# Patient Record
Sex: FEMALE | Race: WHITE | NOT HISPANIC OR LATINO | Employment: PART TIME | URBAN - METROPOLITAN AREA
[De-identification: names, ages, dates, MRNs, and addresses within clinical notes are randomized per-mention and may not be internally consistent; named-entity substitution may affect disease eponyms.]

---

## 2017-08-04 ENCOUNTER — OFFICE VISIT (OUTPATIENT)
Dept: BEHAVIORAL HEALTH | Facility: PHYSICIAN GROUP | Age: 22
End: 2017-08-04
Payer: COMMERCIAL

## 2017-08-04 DIAGNOSIS — F31.9 BIPOLAR 1 DISORDER (HCC): Primary | ICD-10-CM

## 2017-08-04 PROCEDURE — 99214 OFFICE O/P EST MOD 30 MIN: CPT | Performed by: STUDENT IN AN ORGANIZED HEALTH CARE EDUCATION/TRAINING PROGRAM

## 2017-08-04 RX ORDER — LAMOTRIGINE 100 MG/1
100 TABLET ORAL 2 TIMES DAILY
Qty: 60 TAB | Refills: 0 | Status: SHIPPED | OUTPATIENT
Start: 2017-08-04 | End: 2017-09-05 | Stop reason: SDUPTHER

## 2017-08-04 RX ORDER — LAMOTRIGINE 100 MG/1
100 TABLET ORAL 2 TIMES DAILY
Qty: 60 TAB | Refills: 0 | Status: SHIPPED | OUTPATIENT
Start: 2017-08-04 | End: 2017-08-04 | Stop reason: SDUPTHER
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
## 2017-08-04 NOTE — MR AVS SNAPSHOT
Michelle Ziegler   2017 9:00 AM   Appointment   MRN: [id number]    Department:  [facility]    Dept Phone:  [phone]    Description:  Female : 1995   Provider:  Magdy Bishop M.D.           Allergies as of 2017     Allergen Noted Reactions    Ceclor [Cefaclor] 2010   Vomiting    Rocephin [Ceftriaxone Sodium] 2010   Rash    As infant      Basic Information     Date Of Birth Sex Race Ethnicity Preferred Language    1995 Female White Non- English      Your appointments     Sep 05, 2017  9:30 AM   Follow Up Med Management with Magdy Bishop M.D.   [facility] [address]    [address] [place] [address] [address]  [facility] [id number] [phone]            Oct 16, 2017 10:30 AM   Initial Behavioral Health Eval with Mercedes Beckwith L.C.S.W.   [facility] [address]    [address] [place] [id number] [phone]              Health Maintenance     Patient has no pending health maintenance at this time      Current Immunizations     No immunizations on file.      Below and/or attached are the medications your provider expects you to take. Review all of your home medications and newly ordered medications with your provider and/or pharmacist. Follow medication instructions as directed by your provider and/or pharmacist. Please keep your medication list with you and share with your provider. Update the information when medications are discontinued, doses are changed, or new medications (including over-the-counter products) are added; and carry medication information at all times in the event of emergency situations     Allergies:  CECLOR - Vomiting     ROCEPHIN - Rash               Medications  Valid as of: 2017 -  9:46 AM    Generic Name Brand Name Tablet Size Instructions for use    Drospirenone-Ethinyl Estradiol (Tab) CHRISTINA 3-0.02 MG Take  by mouth.        FLUoxetine HCl   Take  by mouth.        Meclizine HCl (Tab)  ANTIVERT 25 MG Take 1 Tab by mouth 3 times a day as needed.        .                 Medicines prescribed today were sent to:     None      Medication refill instructions:       If your prescription bottle indicates you have medication refills left, it is not necessary to call your provider’s office. Please contact your pharmacy and they will refill your medication.    If your prescription bottle indicates you do not have any refills left, you may request refills at any time through one of the following ways: The online Vis[employer] World system (except Urgent Care), by calling your provider’s office, or by asking your pharmacy to contact your provider’s office with a refill request. Medication refills are processed only during regular business hours and may not be available until the next business day. Your provider may request additional information or to have a follow-up visit with you prior to refilling your medication.   *Please Note: Medication refills are assigned a new Rx number when refilled electronically. Your pharmacy may indicate that no refills were authorized even though a new prescription for the same medication is available at the pharmacy. Please request the medicine by name with the pharmacy before contacting your provider for a refill.           Visible World Access Code: [email or web address]  Expires: 9/3/2017  9:46 AM    Visible World  A secure, online tool to manage your health information     [employer] Visible World® is a secure, online tool that connects you to your personalized health information from the privacy of your home -- day or night - making it very easy for you to manage your healthcare. Once the activation process is completed, you can even access your medical information using the Visible World yon, which is available for free in the Apple Yon store or Google Play store.     Visible World provides the following levels of access (as shown below):   My Chart Features   [facility] Primary Care Doctor [facility]  Specialists  [facility]  Urgent  Care [facility]  Primary Care  Doctor   Email your healthcare team securely and privately 24/7 X X X    Manage appointments: schedule your next appointment; view details of past/upcoming appointments X      Request prescription refills. X      View recent personal medical records, including lab and immunizations X X X X   View health record, including health history, allergies, medications X X X X   Read reports about your outpatient visits, procedures, consult and ER notes X X X X   See your discharge summary, which is a recap of your hospital and/or ER visit that includes your diagnosis, lab results, and care plan. X X       How to register for [employer] Information:  1. Go to  [email or web address].  2. Click on the Sign Up Now box, which takes you to the New Member Sign Up page. You will need to provide the following information:  a. Enter your [employer] Information Access Code exactly as it appears at the top of this page. (You will not need to use this code after you’ve completed the sign-up process. If you do not sign up before the expiration date, you must request a new code.)   b. Enter your date of birth.   c. Enter your home email address.   d. Click Submit, and follow the next screen’s instructions.  3. Create a [employer] Information ID. This will be your [employer] Information login ID and cannot be changed, so think of one that is secure and easy to remember.  4. Create a [employer] Information password. You can change your password at any time.  5. Enter your Password Reset Question and Answer. This can be used at a later time if you forget your password.   6. Enter your e-mail address. This allows you to receive e-mail notifications when new information is available in [employer] Information.  7. Click Sign Up. You can now view your health information.    For assistance activating your [employer] Information account, call [phone]

## 2017-08-23 ENCOUNTER — HOSPITAL ENCOUNTER (OUTPATIENT)
Dept: LAB | Facility: MEDICAL CENTER | Age: 22
End: 2017-08-23
Attending: STUDENT IN AN ORGANIZED HEALTH CARE EDUCATION/TRAINING PROGRAM
Payer: COMMERCIAL

## 2017-08-23 LAB
25(OH)D3 SERPL-MCNC: 16 NG/ML (ref 30–100)
ANION GAP SERPL CALC-SCNC: 8 MMOL/L (ref 0–11.9)
BASOPHILS # BLD AUTO: 0.5 % (ref 0–1.8)
BASOPHILS # BLD: 0.06 K/UL (ref 0–0.12)
CHLORIDE SERPL-SCNC: 107 MMOL/L (ref 96–112)
CHOLEST SERPL-MCNC: 167 MG/DL (ref 100–199)
CO2 SERPL-SCNC: 24 MMOL/L (ref 20–33)
EOSINOPHIL # BLD AUTO: 0.19 K/UL (ref 0–0.51)
EOSINOPHIL NFR BLD: 1.7 % (ref 0–6.9)
ERYTHROCYTE [DISTWIDTH] IN BLOOD BY AUTOMATED COUNT: 41 FL (ref 35.9–50)
FASTING STATUS PATIENT QL REPORTED: NORMAL
HCT VFR BLD AUTO: 42.7 % (ref 37–47)
HDLC SERPL-MCNC: 50 MG/DL
HGB BLD-MCNC: 13.8 G/DL (ref 12–16)
IMM GRANULOCYTES # BLD AUTO: 0.09 K/UL (ref 0–0.11)
IMM GRANULOCYTES NFR BLD AUTO: 0.8 % (ref 0–0.9)
LDLC SERPL CALC-MCNC: 98 MG/DL
LYMPHOCYTES # BLD AUTO: 3.47 K/UL (ref 1–4.8)
LYMPHOCYTES NFR BLD: 31.8 % (ref 22–41)
MCH RBC QN AUTO: 28.8 PG (ref 27–33)
MCHC RBC AUTO-ENTMCNC: 32.3 G/DL (ref 33.6–35)
MCV RBC AUTO: 89.1 FL (ref 81.4–97.8)
MONOCYTES # BLD AUTO: 0.82 K/UL (ref 0–0.85)
MONOCYTES NFR BLD AUTO: 7.5 % (ref 0–13.4)
NEUTROPHILS # BLD AUTO: 6.29 K/UL (ref 2–7.15)
NEUTROPHILS NFR BLD: 57.7 % (ref 44–72)
NRBC # BLD AUTO: 0 K/UL
NRBC BLD AUTO-RTO: 0 /100 WBC
PLATELET # BLD AUTO: 378 K/UL (ref 164–446)
PMV BLD AUTO: 10.9 FL (ref 9–12.9)
POTASSIUM SERPL-SCNC: 4.4 MMOL/L (ref 3.6–5.5)
RBC # BLD AUTO: 4.79 M/UL (ref 4.2–5.4)
SODIUM SERPL-SCNC: 139 MMOL/L (ref 135–145)
T4 FREE SERPL-MCNC: 0.78 NG/DL (ref 0.53–1.43)
TRIGL SERPL-MCNC: 93 MG/DL (ref 0–149)
TSH SERPL DL<=0.005 MIU/L-ACNC: 2.89 UIU/ML (ref 0.3–3.7)
VIT B12 SERPL-MCNC: 450 PG/ML (ref 211–911)
WBC # BLD AUTO: 10.9 K/UL (ref 4.8–10.8)

## 2017-08-23 PROCEDURE — 80061 LIPID PANEL: CPT

## 2017-08-23 PROCEDURE — 82607 VITAMIN B-12: CPT

## 2017-08-23 PROCEDURE — 80051 ELECTROLYTE PANEL: CPT

## 2017-08-23 PROCEDURE — 84439 ASSAY OF FREE THYROXINE: CPT

## 2017-08-23 PROCEDURE — 85025 COMPLETE CBC W/AUTO DIFF WBC: CPT

## 2017-08-23 PROCEDURE — 82306 VITAMIN D 25 HYDROXY: CPT

## 2017-08-23 PROCEDURE — 84443 ASSAY THYROID STIM HORMONE: CPT

## 2017-08-23 PROCEDURE — 36415 COLL VENOUS BLD VENIPUNCTURE: CPT

## 2017-08-28 ENCOUNTER — OFFICE VISIT (OUTPATIENT)
Dept: URGENT CARE | Facility: PHYSICIAN GROUP | Age: 22
End: 2017-08-28
Payer: COMMERCIAL

## 2017-08-28 VITALS
SYSTOLIC BLOOD PRESSURE: 120 MMHG | TEMPERATURE: 98.7 F | BODY MASS INDEX: 41.99 KG/M2 | WEIGHT: 237 LBS | HEART RATE: 107 BPM | RESPIRATION RATE: 18 BRPM | OXYGEN SATURATION: 97 % | DIASTOLIC BLOOD PRESSURE: 76 MMHG | HEIGHT: 63 IN

## 2017-08-28 DIAGNOSIS — J02.9 PHARYNGITIS, UNSPECIFIED ETIOLOGY: ICD-10-CM

## 2017-08-28 DIAGNOSIS — E66.01 MORBID OBESITY WITH BMI OF 40.0-44.9, ADULT (HCC): ICD-10-CM

## 2017-08-28 LAB
INT CON NEG: NORMAL
INT CON POS: NORMAL
S PYO AG THROAT QL: NORMAL

## 2017-08-28 PROCEDURE — 99213 OFFICE O/P EST LOW 20 MIN: CPT | Performed by: PHYSICIAN ASSISTANT

## 2017-08-28 PROCEDURE — 87880 STREP A ASSAY W/OPTIC: CPT | Performed by: PHYSICIAN ASSISTANT

## 2017-08-28 ASSESSMENT — ENCOUNTER SYMPTOMS
COUGH: 0
CHILLS: 0
SWOLLEN GLANDS: 0
SORE THROAT: 1
TROUBLE SWALLOWING: 0
SHORTNESS OF BREATH: 0
WHEEZING: 0
STRIDOR: 0
FEVER: 1
HEADACHES: 0
NECK PAIN: 0

## 2017-08-28 NOTE — PATIENT INSTRUCTIONS

## 2017-08-28 NOTE — PROGRESS NOTES
Subjective:      Michelle Ziegler is a 22 y.o. female who presents with Pharyngitis (x1day fever)            Sore throat for the last 2 days with fever last night. Fever resolved today. No other complaints. Concerned about possible strep      Pharyngitis    This is a new problem. The current episode started yesterday. The problem has been waxing and waning. Neither side of throat is experiencing more pain than the other. The maximum temperature recorded prior to her arrival was 102 - 102.9 F. The fever has been present for less than 1 day. The pain is moderate. Pertinent negatives include no congestion, coughing, ear pain, headaches, neck pain, shortness of breath, stridor, swollen glands or trouble swallowing. She has had exposure to strep. She has had no exposure to mono. She has tried nothing for the symptoms. The treatment provided no relief.       Review of Systems   Constitutional: Positive for fever. Negative for chills.   HENT: Positive for sore throat. Negative for congestion, ear pain and trouble swallowing.    Respiratory: Negative for cough, shortness of breath, wheezing and stridor.    Musculoskeletal: Negative for neck pain.   Neurological: Negative for headaches.     Allergies:Ceclor [cefaclor] and Rocephin [ceftriaxone sodium]    Current Outpatient Prescriptions Ordered in [facility]   Medication Sig Dispense Refill   • lamotrigine (LAMICTAL) 100 MG Tab Take 1 Tab by mouth 2 times a day. 60 Tab 0     No current Epic-ordered facility-administered medications on file.        Past Medical History:   Diagnosis Date   • Depression        Social History   Substance Use Topics   • Smoking status: Never Smoker   • Smokeless tobacco: Never Used   • Alcohol use No       Family Status   Relation Status   • Mother Alive   • Father Alive   • Brother Alive   • Paternal Grandfather    • Maternal Grandfather    • Maternal Grandmother      Family History   Problem Relation Age of Onset   • Depression Mother    •  "Suicide Attempts Paternal Grandfather    • Schizophrenia Maternal Grandfather    • Depression Maternal Grandmother           Objective:     /76   Pulse (!) 107   Temp 37.1 °C (98.7 °F)   Resp 18   Ht 1.6 m (5' 3\")   Wt 107.5 kg (237 lb)   SpO2 97%   Breastfeeding? No   BMI 41.98 kg/m²      Physical Exam   Constitutional: She appears well-developed and well-nourished. No distress.   HENT:   Head: Normocephalic and atraumatic.   Right Ear: External ear normal.   Left Ear: External ear normal.   Posterior fundus mildly erythematous without edema or exudate   Eyes: Right eye exhibits no discharge. Left eye exhibits no discharge.   Neck: Normal range of motion. Neck supple.   Cardiovascular: Normal rate.    Pulmonary/Chest: Effort normal.   Lymphadenopathy:     She has cervical adenopathy (mild, anterior).   Skin: Skin is warm and dry. No rash noted. She is not diaphoretic.   Psychiatric: She has a normal mood and affect. Her behavior is normal. Judgment and thought content normal.   Nursing note and vitals reviewed.    Labs: Rapid strep negative          Assessment/Plan:     1. Pharyngitis, unspecified etiology  POCT Rapid Strep A    Mild erythema without edema or exudate. Rapid strep negative. Likely viral. Given written instructions. Follow-up with PCP as needed   2. Morbid obesity with BMI of 40.0-44.9, adult (CMS-[facility])  Patient identified as having weight management issue.  Appropriate orders and counseling given.    Chronic problem. Follow-up with PCP as needed       [employer] Interactive Patient Education given:Pharyngitis    Please note that this dictation was created using voice recognition software. I have made every reasonable attempt to correct obvious errors, but I expect that there are errors of grammar and possibly content that I did not discover before finalizing the note.    "

## 2017-09-05 ENCOUNTER — OFFICE VISIT (OUTPATIENT)
Dept: BEHAVIORAL HEALTH | Facility: PHYSICIAN GROUP | Age: 22
End: 2017-09-05
Payer: COMMERCIAL

## 2017-09-05 DIAGNOSIS — F31.9 BIPOLAR 1 DISORDER (HCC): ICD-10-CM

## 2017-09-05 PROCEDURE — 99213 OFFICE O/P EST LOW 20 MIN: CPT | Performed by: STUDENT IN AN ORGANIZED HEALTH CARE EDUCATION/TRAINING PROGRAM

## 2017-09-05 RX ORDER — LAMOTRIGINE 100 MG/1
100 TABLET ORAL 2 TIMES DAILY
Qty: 60 TAB | Refills: 2 | Status: SHIPPED | OUTPATIENT
Start: 2017-09-05 | End: 2017-11-07 | Stop reason: SDUPTHER
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
## 2017-10-16 ENCOUNTER — OFFICE VISIT (OUTPATIENT)
Dept: BEHAVIORAL HEALTH | Facility: PHYSICIAN GROUP | Age: 22
End: 2017-10-16
Payer: COMMERCIAL

## 2017-10-16 DIAGNOSIS — F43.22 ADJUSTMENT DISORDER WITH ANXIOUS MOOD: ICD-10-CM

## 2017-10-16 PROCEDURE — 90791 PSYCH DIAGNOSTIC EVALUATION: CPT | Performed by: SOCIAL WORKER
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
## 2017-10-31 ENCOUNTER — OFFICE VISIT (OUTPATIENT)
Dept: BEHAVIORAL HEALTH | Facility: PHYSICIAN GROUP | Age: 22
End: 2017-10-31
Payer: COMMERCIAL

## 2017-10-31 DIAGNOSIS — F43.22 ADJUSTMENT DISORDER WITH ANXIOUS MOOD: ICD-10-CM

## 2017-10-31 DIAGNOSIS — F31.9 BIPOLAR 1 DISORDER (HCC): ICD-10-CM

## 2017-10-31 PROCEDURE — 90834 PSYTX W PT 45 MINUTES: CPT | Performed by: SOCIAL WORKER

## 2017-10-31 NOTE — BH THERAPY
" [facility]  Therapy Progress Note    Patient Name: Michelle Ziegler  Patient MRN: [id number]  Today's Date: 10/31/2017     Type of session:Individual psychotherapy  Length of session: 45 minutes  Persons in attendance:Patient    Subjective/New Info: Client relayed she was feeling overwhelmed by her family. \"They put all their issues on me, but no one wants to listen to me.\" She stated she feels like she is the family fixer, and needs to, \"at least try to make things better.\" While processing, she realized, \"I have never actually fixed anything.\" Discussed where her energy goes. Asked client to brainstorm ways she can invest energy into herself.     Objective/Observations:   Participation: Active verbal participation, Attentive and Engaged   Grooming: Good and Casual   Cognition: Alert and Fully Oriented   Eye contact: Limited   Mood: Depressed and Anxious   Affect: Congruent with content   Thought process: Logical and Goal-directed   Speech: Rate within normal limits and Volume within normal limits   Other:     Diagnoses:   1. Adjustment disorder with anxious mood    2. Bipolar 1 disorder (CMS-[facility])         Current risk:   SUICIDE: Low   Homicide: Not applicable   Self-harm: Not applicable   Relapse: Not applicable   Other:    Safety Plan reviewed? Not Indicated   If evidence of imminent risk is present, intervention/plan:     Therapeutic Intervention(s): Supportive psychotherapy    Treatment Goal(s)/Objective(s) addressed: Developing goals. Client would like a safe place to process her stressors and find ways to mitigate her anxiety.      Progress toward Treatment Goals: No change    Plan:  - Continue Individual therapy  - Next appointment scheduled:  11/7/2017  - Patient is in agreement with the above plan:  YES    Mercedes Beckwith  10/31/2017                                 "

## 2017-11-07 ENCOUNTER — OFFICE VISIT (OUTPATIENT)
Dept: BEHAVIORAL HEALTH | Facility: PHYSICIAN GROUP | Age: 22
End: 2017-11-07
Payer: COMMERCIAL

## 2017-11-07 DIAGNOSIS — F31.9 BIPOLAR 1 DISORDER (HCC): ICD-10-CM

## 2017-11-07 DIAGNOSIS — E66.01 MORBID OBESITY WITH BMI OF 40.0-44.9, ADULT (HCC): ICD-10-CM

## 2017-11-07 PROCEDURE — 99214 OFFICE O/P EST MOD 30 MIN: CPT | Performed by: STUDENT IN AN ORGANIZED HEALTH CARE EDUCATION/TRAINING PROGRAM

## 2017-11-07 RX ORDER — LAMOTRIGINE 100 MG/1
100 TABLET ORAL 2 TIMES DAILY
Qty: 180 TAB | Refills: 1 | Status: SHIPPED | OUTPATIENT
Start: 2017-11-07 | End: 2018-01-02 | Stop reason: SDUPTHER
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
## 2017-11-16 ENCOUNTER — OFFICE VISIT (OUTPATIENT)
Dept: BEHAVIORAL HEALTH | Facility: PHYSICIAN GROUP | Age: 22
End: 2017-11-16
Payer: COMMERCIAL

## 2017-11-16 DIAGNOSIS — F43.22 ADJUSTMENT DISORDER WITH ANXIOUS MOOD: ICD-10-CM

## 2017-11-16 PROCEDURE — 90834 PSYTX W PT 45 MINUTES: CPT | Performed by: SOCIAL WORKER

## 2017-11-16 NOTE — BH THERAPY
".   [facility]  Therapy Progress Note    Patient Name: Michelle Ziegler  Patient MRN: [id number]  Today's Date: 10/31/2017     Type of session:Individual psychotherapy  Length of session: 45 minutes  Persons in attendance:Patient    Subjective/New Info: Client relayed she was feeling better. She got a job, bought an air mattress and carlos some space in her dad's house for herself. \"I stopped telling myself I can fix things that I can't, and just focused on myself.\" Discussed her negative core beliefs, and the distorted thinking that comes it. Used \"[employer]high[employer]\" methaphor. Discussed noticing patterns.     Objective/Observations:   Participation: Active verbal participation, Attentive and Engaged   Grooming: Good and Casual   Cognition: Alert and Fully Oriented   Eye contact: Limited   Mood: Euthymic    Affect: Congruent with content   Thought process: Logical and Goal-directed   Speech: Rate within normal limits and Volume within normal limits   Other:     Diagnoses:   1. Adjustment disorder with anxious mood         Current risk:   SUICIDE: Low   Homicide: Not applicable   Self-harm: Not applicable   Relapse: Not applicable   Other:    Safety Plan reviewed? Not Indicated   If evidence of imminent risk is present, intervention/plan:     Therapeutic Intervention(s): Supportive psychotherapy, Goal identificaiton    Treatment Goal(s)/Objective(s) addressed:   1) Increase self-compassion.    2) Learn new ways to interact with anxiety using ACT skills and processes.    Progress toward Treatment Goals: Moderate improvement.    Plan:  - Continue Individual therapy  - Next appointment scheduled:  11/7/2017  - Patient is in agreement with the above plan:  YES    Mercedes Beckwith  10/31/2017                               "

## 2017-11-30 ENCOUNTER — OFFICE VISIT (OUTPATIENT)
Dept: BEHAVIORAL HEALTH | Facility: PHYSICIAN GROUP | Age: 22
End: 2017-11-30
Payer: COMMERCIAL

## 2017-11-30 DIAGNOSIS — F43.22 ADJUSTMENT DISORDER WITH ANXIOUS MOOD: ICD-10-CM

## 2017-11-30 PROCEDURE — 90834 PSYTX W PT 45 MINUTES: CPT | Performed by: SOCIAL WORKER

## 2017-11-30 NOTE — BH THERAPY
[facility]  Therapy Progress Note    Patient Name: Michelle Ziegler  Patient MRN: [id number]  Today's Date: 11/30/2017     Type of session:Individual psychotherapy  Length of session: 45 minutes  Persons in attendance:Patient    Subjective/New Info: Client reported she started work. She has some anxiety about it, but overall she feels good. Discussed client's adherence to her story, and how she plays her story out in her life. Discussed how her negative core belief impacts her story. Engaged in a defusion exercise to help client separate from her story. She responded well.     Objective/Observations:   Participation: Active verbal participation, Attentive, Engaged and Open to feedback   Grooming: Good and Casual   Cognition: Alert and Fully Oriented   Eye contact: Good   Mood: Euthymic   Affect: Flexible and Congruent with content   Thought process: Logical and Goal-directed   Speech: Rate within normal limits and Volume within normal limits   Other:     Diagnoses:   1. Adjustment disorder with anxious mood         Current risk:   SUICIDE: Low   Homicide: Not applicable   Self-harm: Not applicable   Relapse: Not applicable   Other:    Safety Plan reviewed? Not Indicated   If evidence of imminent risk is present, intervention/plan:     Therapeutic Intervention(s): Supportive psychotherapy and Defusion    Treatment Goal(s)/Objective(s) addressed:     1) Increase self-compassion.    2) Learn new ways to interact with anxiety using ACT skills and processes.    Progress toward Treatment Goals: Mild improvement    Plan:  - Continue Individual therapy  - Next appointment scheduled:  12/14/2017  - Patient is in agreement with the above plan:  YES    Mercedes Beckwith  11/30/2017

## 2018-01-02 ENCOUNTER — OFFICE VISIT (OUTPATIENT)
Dept: BEHAVIORAL HEALTH | Facility: PHYSICIAN GROUP | Age: 23
End: 2018-01-02
Payer: COMMERCIAL

## 2018-01-02 DIAGNOSIS — F31.9 BIPOLAR 1 DISORDER (HCC): ICD-10-CM

## 2018-01-02 PROCEDURE — 99213 OFFICE O/P EST LOW 20 MIN: CPT | Performed by: STUDENT IN AN ORGANIZED HEALTH CARE EDUCATION/TRAINING PROGRAM

## 2018-01-02 RX ORDER — LAMOTRIGINE 100 MG/1
TABLET ORAL
Qty: 75 TAB | Refills: 2 | Status: SHIPPED | OUTPATIENT
Start: 2018-01-02 | End: 2018-02-13 | Stop reason: SDUPTHER
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
## 2018-01-08 ENCOUNTER — OFFICE VISIT (OUTPATIENT)
Dept: BEHAVIORAL HEALTH | Facility: PHYSICIAN GROUP | Age: 23
End: 2018-01-08
Payer: COMMERCIAL

## 2018-01-08 DIAGNOSIS — F31.9 BIPOLAR 1 DISORDER (HCC): ICD-10-CM

## 2018-01-08 DIAGNOSIS — F43.22 ADJUSTMENT DISORDER WITH ANXIOUS MOOD: ICD-10-CM

## 2018-01-08 PROCEDURE — 90834 PSYTX W PT 45 MINUTES: CPT | Performed by: SOCIAL WORKER

## 2018-01-08 NOTE — BH THERAPY
" [facility]  Therapy Progress Note    Patient Name: Michelle Ziegler  Patient MRN: [id number]  Today's Date: 1/8/2018     Type of session:Individual psychotherapy  Length of session: 55 minutes  Persons in attendance:Patient    Subjective/New Info: Client stated, \"Things have not been good.\" She is arguing with her girlfriend, and having trouble at home. Her younger brother is expecting another child, and this has the potential to create financial hardship on ct's father, which could delay client going to grad school. \"Paula takes care of him. He gets away with everything!\" Client reported trouble sleeping, low appetite, and increased bouts of crying. \"I'm just trying to get through it a day at a time right now.\" Discussed how it is okay to not be okay sometimes. Ct reports feeling better with recent medication increase.    Objective/Observations:   Participation: Active verbal participation   Grooming: Good and Casual   Cognition: Alert and Fully Oriented   Eye contact: Poor   Mood: Depressed   Affect: Congruent with content, Sad and Anxious   Thought process: Logical and Goal-directed   Speech: Rate within normal limits and Volume within normal limits   Other:     Diagnoses:   1. Adjustment disorder with anxious mood    2. Bipolar 1 disorder (CMS-[facility])         Current risk:   SUICIDE: Low   Homicide: Not applicable   Self-harm: Not applicable   Relapse: Not applicable   Other:    Safety Plan reviewed? Not Indicated   If evidence of imminent risk is present, intervention/plan:     Therapeutic Intervention(s): Supportive psychotherapy    Treatment Goal(s)/Objective(s) addressed:   1) Increase self-compassion.    2) Learn new ways to interact with anxiety using ACT skills and processes.    Progress toward Treatment Goals: No change    Plan:  - Continue Individual therapy  - Next appointment scheduled:  1/18/2018  - Patient is in agreement with the above plan:  YES    Mercedes HUTCHINS" Tang  1/8/2018

## 2018-01-18 ENCOUNTER — OFFICE VISIT (OUTPATIENT)
Dept: BEHAVIORAL HEALTH | Facility: PHYSICIAN GROUP | Age: 23
End: 2018-01-18
Payer: COMMERCIAL

## 2018-01-18 DIAGNOSIS — F31.9 BIPOLAR 1 DISORDER (HCC): ICD-10-CM

## 2018-01-18 DIAGNOSIS — F43.22 ADJUSTMENT DISORDER WITH ANXIOUS MOOD: ICD-10-CM

## 2018-01-18 PROCEDURE — 90834 PSYTX W PT 45 MINUTES: CPT | Performed by: SOCIAL WORKER

## 2018-01-18 NOTE — BH THERAPY
[facility]  Therapy Progress Note    Patient Name: Michelle Ziegler  Patient MRN: [id number]  Today's Date: 1/18/2018     Type of session:Individual psychotherapy  Length of session: 45 minutes  Persons in attendance:Patient    Subjective/New Info: Client reported that things were better this week. She is working consistently, found an apartment, and is going to [place] to see her girlfriend tomorrow. She has some concerns about the trip, but is trying to be realistic about how things may turn out with her girlfriend.     Objective/Observations:   Participation: Active verbal participation   Grooming: Good and Casual   Cognition: Alert and Fully Oriented   Eye contact: Good   Mood: Euthymic   Affect: Congruent with content   Thought process: Logical and Goal-directed   Speech: Rate within normal limits and Volume within normal limits   Other:     Diagnoses:   1. Adjustment disorder with anxious mood    2. Bipolar 1 disorder (CMS-[facility])         Current risk:   SUICIDE: Low   Homicide: Not applicable   Self-harm: Not applicable   Relapse: Not applicable   Other:    Safety Plan reviewed? Not Indicated   If evidence of imminent risk is present, intervention/plan:     Therapeutic Intervention(s): Supportive psychotherapy    Treatment Goal(s)/Objective(s) addressed:  1) Increase self-compassion.    2) Learn new ways to interact with anxiety using ACT skills and processes.    Progress toward Treatment Goals: Mild improvement    Plan:  - Continue Group therapy and Medication management  - Next appointment scheduled:  2/13/2018  - Patient is in agreement with the above plan:  YES    Mercedes Beckwith  1/18/2018

## 2018-02-13 ENCOUNTER — OFFICE VISIT (OUTPATIENT)
Dept: BEHAVIORAL HEALTH | Facility: PHYSICIAN GROUP | Age: 23
End: 2018-02-13
Payer: COMMERCIAL

## 2018-02-13 DIAGNOSIS — F31.9 BIPOLAR 1 DISORDER (HCC): ICD-10-CM

## 2018-02-13 PROCEDURE — 99214 OFFICE O/P EST MOD 30 MIN: CPT | Performed by: STUDENT IN AN ORGANIZED HEALTH CARE EDUCATION/TRAINING PROGRAM

## 2018-02-13 RX ORDER — LAMOTRIGINE 100 MG/1
TABLET ORAL
Qty: 75 TAB | Refills: 5 | Status: SHIPPED | OUTPATIENT
Start: 2018-02-13 | End: 2018-06-11 | Stop reason: SDUPTHER
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
## 2018-02-15 ENCOUNTER — OFFICE VISIT (OUTPATIENT)
Dept: BEHAVIORAL HEALTH | Facility: PHYSICIAN GROUP | Age: 23
End: 2018-02-15
Payer: COMMERCIAL

## 2018-02-15 DIAGNOSIS — F31.9 BIPOLAR 1 DISORDER (HCC): ICD-10-CM

## 2018-02-15 PROCEDURE — 90834 PSYTX W PT 45 MINUTES: CPT | Performed by: SOCIAL WORKER

## 2018-02-15 NOTE — BH THERAPY
[facility]  Therapy Progress Note    Patient Name: Michelle Ziegler  Patient MRN: [id number]  Today's Date: 2/15/2018     Type of session:Individual psychotherapy  Length of session: 45 minutes  Persons in attendance:Patient    Subjective/New Info: Client reported there have been ups and downs, but she feels she is handling things better at this time. She is moving into a new apartment, continues to work, and is making an effort to be around people. She is working on her relationship with her girlfriend. Discussed how it is okay to not be okay when hard things happen.     Objective/Observations:   Participation: Active verbal participation, Attentive and Engaged   Grooming: Good and Casual   Cognition: Alert and Fully Oriented   Eye contact: Good   Mood: Euthymic   Affect: Flexible and Congruent with content   Thought process: Logical and Goal-directed   Speech: Rate within normal limits and Volume within normal limits   Other:     Diagnoses:   1. Bipolar 1 disorder (CMS-[facility])         Current risk:   SUICIDE: Low   Homicide: Not applicable   Self-harm: Not applicable   Relapse: Not applicable   Other:    Safety Plan reviewed? Not Indicated   If evidence of imminent risk is present, intervention/plan:     Therapeutic Intervention(s): Stressors assessed and Supportive psychotherapy    Treatment Goal(s)/Objective(s) addressed:     1) Increase self-compassion.    2) Learn new ways to interact with anxiety using ACT skills and processes    Progress toward Treatment Goals: Mild improvement    Plan:  - Continue Individual therapy and Medication management  - Next appointment scheduled:  3/1/2018  - Patient is in agreement with the above plan:  YES    Mercedes Beckwith  2/15/2018

## 2018-03-01 ENCOUNTER — OFFICE VISIT (OUTPATIENT)
Dept: BEHAVIORAL HEALTH | Facility: PHYSICIAN GROUP | Age: 23
End: 2018-03-01
Payer: COMMERCIAL

## 2018-03-01 DIAGNOSIS — F31.9 BIPOLAR 1 DISORDER (HCC): ICD-10-CM

## 2018-03-01 PROCEDURE — 90834 PSYTX W PT 45 MINUTES: CPT | Performed by: SOCIAL WORKER

## 2018-03-01 NOTE — BH THERAPY
[facility]  Therapy Progress Note    Patient Name: Michelle Ziegler  Patient MRN: [id number]  Today's Date: 3/1/2018     Type of session:Individual psychotherapy  Length of session: 45 minutes  Persons in attendance:Patient    Subjective/New Info: Client reported she was feeling a lot of anxiety. The deep sadness she experienced in December and January has lifted, but she is experiencing anxiety and sadness around her current relationship. She is unsure how she wants to move forward with her relationship, and has fears about it ending. She also has fears about it continuing. She is trying to be mindful of her feelings so she doesn't spiral into a deep depression. Suggested journaling to help her find clarity.     Objective/Observations:   Participation: Active verbal participation   Grooming: Good and Casual   Cognition: Alert and Fully Oriented   Eye contact: Good   Mood: Anxious   Affect: Congruent with content   Thought process: Logical and Goal-directed   Speech: Rate within normal limits and Volume within normal limits   Other:     Diagnoses:   1. Bipolar 1 disorder (CMS-[facility])         Current risk:   SUICIDE: Low   Homicide: Low   Self-harm: Not applicable   Relapse: Not applicable   Other:    Safety Plan reviewed? Not Indicated   If evidence of imminent risk is present, intervention/plan:     Therapeutic Intervention(s): Conflict clarification and Supportive psychotherapy    Treatment Goal(s)/Objective(s) addressed:   1) Increase self-compassion.    2) Learn new ways to interact with anxiety using ACT skills and processes    Progress toward Treatment Goals: Mild improvement    Plan:  - Continue Individual therapy  - Next appointment scheduled:  3/15/2018  - Patient is in agreement with the above plan:  YES    Mercedes Beckwith  3/1/2018

## 2018-03-15 ENCOUNTER — APPOINTMENT (OUTPATIENT)
Dept: BEHAVIORAL HEALTH | Facility: PHYSICIAN GROUP | Age: 23
End: 2018-03-15
Payer: COMMERCIAL

## 2018-03-29 ENCOUNTER — OFFICE VISIT (OUTPATIENT)
Dept: BEHAVIORAL HEALTH | Facility: PHYSICIAN GROUP | Age: 23
End: 2018-03-29
Payer: COMMERCIAL

## 2018-03-29 DIAGNOSIS — F31.9 BIPOLAR 1 DISORDER (HCC): ICD-10-CM

## 2018-03-29 PROCEDURE — 90834 PSYTX W PT 45 MINUTES: CPT | Performed by: SOCIAL WORKER

## 2018-03-29 NOTE — BH THERAPY
" [facility]  Therapy Progress Note    Patient Name: Michelle Ziegler  Patient MRN: [id number]  Today's Date: 3/29/2018     Type of session:Individual psychotherapy  Length of session: 45 minutes  Persons in attendance:Patient    Subjective/New Info: Client reported she feels very stuck and conflicted about how to move forward. She is shouldering the expectation of many, and feels spread thin and unsure of how to move forward. She fears disappointing her mother if she goes away to grad school. \"She has never been proud of me. I have never been enough for her.\" She is holding onto a significant mother wound. She remains future oriented and optimistic about her plans, often self-correcting \"If I move away\" to \"When I move away.\"    Objective/Observations:   Participation: Active verbal participation and Engaged   Grooming: Good and Casual   Cognition: Alert and Fully Oriented   Eye contact: Limited   Mood: Depressed   Affect: Congruent with content   Thought process: Logical and Goal-directed   Speech: Rate within normal limits and Volume within normal limits   Other:     Diagnoses:   1. Bipolar 1 disorder (CMS-[facility])         Current risk:   SUICIDE: Low   Homicide: Not applicable   Self-harm: Not applicable   Relapse: Not applicable   Other:    Safety Plan reviewed? Not Indicated   If evidence of imminent risk is present, intervention/plan:     Therapeutic Intervention(s): Clarify:  Clarify feelings and Supportive psychotherapy    Treatment Goal(s)/Objective(s) addressed:   1) Increase self-compassion.    2) Learn new ways to interact with anxiety using ACT skills and processes    Progress toward Treatment Goals: Mild improvement    Plan:  - Continue Individual therapy and Medication management  - Next appointment scheduled:  4/12/2018  - Patient is in agreement with the above plan:  YES    Mercedes Beckwith  3/29/2018                                 "

## 2018-04-12 ENCOUNTER — OFFICE VISIT (OUTPATIENT)
Dept: BEHAVIORAL HEALTH | Facility: PHYSICIAN GROUP | Age: 23
End: 2018-04-12
Payer: COMMERCIAL

## 2018-04-12 DIAGNOSIS — F31.9 BIPOLAR 1 DISORDER (HCC): ICD-10-CM

## 2018-04-12 PROCEDURE — 90834 PSYTX W PT 45 MINUTES: CPT | Performed by: SOCIAL WORKER

## 2018-04-12 NOTE — BH THERAPY
" [facility]  Therapy Progress Note    Patient Name: Michelle Ziegler  Patient MRN: [id number]  Today's Date: 4/12/2018     Type of session:Individual psychotherapy  Length of session: 45 minutes  Persons in attendance:Patient    Subjective/New Info: \"It's been a rough few weeks. Last night was the worst. I'm just not happy.\" Client reported she realized she has bene living up to other's expectations, and not her own. She had a moment of realization in in session, and broke down crying. \"I have been more responsible than my parents. They have high expectations for me that they don't even do.\" Afterward, she reported she felt better, as she realized she can do whatever she wants. She stated she plans on getting in her car and driving somewhere \"anywhere!\" for the next few days.     Objective/Observations:   Participation: Active verbal participation, Attentive, Engaged and Open to feedback   Grooming: Good and Casual   Cognition: Alert and Fully Oriented   Eye contact: Good   Mood: Depressed and Anxious   Affect: Flexible, Congruent with content, Sad and Tearful   Thought process: Logical and Goal-directed   Speech: Rate within normal limits and Volume within normal limits   Other:     Diagnoses:   1. Bipolar 1 disorder (CMS-[facility])         Current risk:   SUICIDE: Low   Homicide: Not applicable   Self-harm: Low   Relapse: Not applicable   Other:    Safety Plan reviewed? Not Indicated   If evidence of imminent risk is present, intervention/plan:     Therapeutic Intervention(s): Parenting/familial roles addressed and Supportive psychotherapy    Treatment Goal(s)/Objective(s) addressed:   1) Increase self-compassion.    2) Learn new ways to interact with anxiety using ACT skills and processes    Progress toward Treatment Goals: Mild improvement    Plan:  - Continue Individual therapy and Medication management  - Next appointment scheduled:  4/26/2018  - Patient is in agreement with the above plan:  " YES    Mercedes Beckwith  4/12/2018

## 2018-04-26 ENCOUNTER — OFFICE VISIT (OUTPATIENT)
Dept: BEHAVIORAL HEALTH | Facility: PHYSICIAN GROUP | Age: 23
End: 2018-04-26
Payer: COMMERCIAL

## 2018-04-26 DIAGNOSIS — F31.9 BIPOLAR 1 DISORDER (HCC): ICD-10-CM

## 2018-04-26 PROCEDURE — 90834 PSYTX W PT 45 MINUTES: CPT | Performed by: SOCIAL WORKER

## 2018-04-26 NOTE — BH THERAPY
" [facility]  Therapy Progress Note    Patient Name: Michelle Ziegler  Patient MRN: [id number]  Today's Date: 4/26/2018     Type of session:Individual psychotherapy  Length of session: 45 minutes  Persons in attendance:Patient    Subjective/New Info: Client reported she ended up going to [place] for the 4 day trip she decided to take last session. \"I went. It was great. And the only person upset was my roommate.\" Worked with client on developing insight into her own projected expectations, and recognizing her own self-imposed limitations. She was able to recognize how these impact her mood. Asked client to think about what living in her values would look like, versus living to avoid a feeling.     Objective/Observations:   Participation: Active verbal participation, Attentive, Engaged and Open to feedback   Grooming: Good and Casual   Cognition: Alert and Fully Oriented   Eye contact: Good   Mood: Euthymic   Affect: Flexible   Thought process: Logical and Goal-directed   Speech: Rate within normal limits and Volume within normal limits   Other:     Diagnoses:   1. Bipolar 1 disorder (CMS-[facility])         Current risk:   SUICIDE: Low   Homicide: Not applicable   Self-harm: Low   Relapse: Not applicable   Other:    Safety Plan reviewed? Not Indicated   If evidence of imminent risk is present, intervention/plan:     Therapeutic Intervention(s): Supportive psychotherapy and Therapeutic relationship, and Values Clarification     Treatment Goal(s)/Objective(s) addressed:   1) Increase self-compassion.    2) Learn new ways to interact with anxiety using ACT skills and processes    Progress toward Treatment Goals: Moderate improvement    Plan:  - Continue Individual therapy  - Next appointment scheduled:  5/10/2018  - Patient is in agreement with the above plan:  YES    Mercedes Beckwith  4/26/2018            "

## 2018-05-10 ENCOUNTER — OFFICE VISIT (OUTPATIENT)
Dept: BEHAVIORAL HEALTH | Facility: PHYSICIAN GROUP | Age: 23
End: 2018-05-10
Payer: COMMERCIAL

## 2018-05-10 DIAGNOSIS — F31.9 BIPOLAR 1 DISORDER (HCC): ICD-10-CM

## 2018-05-10 PROCEDURE — 90834 PSYTX W PT 45 MINUTES: CPT | Performed by: SOCIAL WORKER

## 2018-05-10 NOTE — BH THERAPY
" [facility]  Therapy Progress Note    Patient Name: Michelle Ziegler  Patient MRN: [id number]  Today's Date: 5/10/2018     Type of session:Individual psychotherapy  Length of session: 50 minutes  Persons in attendance:Patient    Subjective/New Info: Client stated she has been very overwhelmed this past week. Her girlfriend's dog is sick, and her dog is pretty sick as well. She also found out her great grandmother is on hospice, and her grandmother is ill. \"It's too much.\" Discussed client's struggle with change, and how loss represents painful change. \"I don't know if I am strong enough to do it.\" Psychoeducation on grief and loss, and  ACT skills and processes to help client remain flexible in this challenging time.     Objective/Observations:   Participation: Active verbal participation and Engaged   Grooming: Good and Casual   Cognition: Alert and Fully Oriented   Eye contact: Good   Mood: Depressed and Anxious   Affect: Congruent with content   Thought process: Logical and Goal-directed   Speech: Rate within normal limits and Volume within normal limits   Other:     Diagnoses:   1. Bipolar 1 disorder (HCC)         Current risk:   SUICIDE: Low   Homicide: Not applicable   Self-harm: Low   Relapse: Not applicable   Other:    Safety Plan reviewed? Not Indicated   If evidence of imminent risk is present, intervention/plan:     Therapeutic Intervention(s): Clarify:  Clarify feelings and Clarify values, Supportive psychotherapy and ACT skills and processes, including acceptance    Treatment Goal(s)/Objective(s) addressed:   1) Increase self-compassion.    2) Learn new ways to interact with anxiety using ACT skills and processes    Progress toward Treatment Goals: Mild improvement    Plan:  - Continue Individual therapy and Medication management  - Next appointment scheduled:  5/25/2018  - Patient is in agreement with the above plan:  YES    Mercedes Beckwith  5/10/2018                                 "

## 2018-05-25 ENCOUNTER — OFFICE VISIT (OUTPATIENT)
Dept: BEHAVIORAL HEALTH | Facility: PHYSICIAN GROUP | Age: 23
End: 2018-05-25
Payer: COMMERCIAL

## 2018-05-25 DIAGNOSIS — F31.9 BIPOLAR 1 DISORDER (HCC): ICD-10-CM

## 2018-05-25 PROCEDURE — 90834 PSYTX W PT 45 MINUTES: CPT | Performed by: SOCIAL WORKER

## 2018-05-25 NOTE — BH THERAPY
" [facility]  Therapy Progress Note    Patient Name: Michelle Ziegler  Patient MRN: [id number]  Today's Date: 5/25/2018     Type of session:Individual psychotherapy  Length of session: 45 minutes  Persons in attendance:Patient    Subjective/New Info: Client reported increased sadness and anger due to her greatgrandmother is ill and her health is rapidly declining. \"I hate that death is a thing we have to deal with.\" Processed some of her feelings related to grief. She expressed that some of this relates back to her having a loss of control.     Objective/Observations:   Participation: Active verbal participation   Grooming: Good and Casual   Cognition: Alert and Fully Oriented   Eye contact: Good   Mood: Depressed and Anxious   Affect: Congruent with content   Thought process: Logical and Goal-directed   Speech: Rate within normal limits and Volume within normal limits   Other:     Diagnoses:   1. Bipolar 1 disorder (HCC)         Current risk:   SUICIDE: Low   Homicide: Not applicable   Self-harm: Low   Relapse: Not applicable   Other:    Safety Plan reviewed? Not Indicated   If evidence of imminent risk is present, intervention/plan:     Therapeutic Intervention(s): Psychoeducation RE: grief and loss and Supportive psychotherapy    Treatment Goal(s)/Objective(s) addressed:   1) Increase self-compassion.    2) Learn new ways to interact with anxiety using ACT skills and processes    Progress toward Treatment Goals: Mild improvement    Plan:  - Continue Individual therapy and Medication management  - Next appointment scheduled:  6/12/2018  - Patient is in agreement with the above plan:  YES    Mercedes Beckwith  5/25/2018                                 "

## 2018-06-11 ENCOUNTER — OFFICE VISIT (OUTPATIENT)
Dept: BEHAVIORAL HEALTH | Facility: PHYSICIAN GROUP | Age: 23
End: 2018-06-11
Payer: COMMERCIAL

## 2018-06-11 DIAGNOSIS — F31.9 BIPOLAR 1 DISORDER (HCC): ICD-10-CM

## 2018-06-11 PROCEDURE — 99214 OFFICE O/P EST MOD 30 MIN: CPT | Performed by: STUDENT IN AN ORGANIZED HEALTH CARE EDUCATION/TRAINING PROGRAM

## 2018-06-11 RX ORDER — LAMOTRIGINE 100 MG/1
TABLET ORAL
Qty: 75 TAB | Refills: 5 | Status: SHIPPED | OUTPATIENT
Start: 2018-06-11 | End: 2018-09-18 | Stop reason: SDUPTHER
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
## 2018-06-12 ENCOUNTER — APPOINTMENT (OUTPATIENT)
Dept: BEHAVIORAL HEALTH | Facility: PHYSICIAN GROUP | Age: 23
End: 2018-06-12
Payer: COMMERCIAL

## 2018-06-19 ENCOUNTER — OFFICE VISIT (OUTPATIENT)
Dept: BEHAVIORAL HEALTH | Facility: PHYSICIAN GROUP | Age: 23
End: 2018-06-19
Payer: COMMERCIAL

## 2018-06-19 DIAGNOSIS — F31.9 BIPOLAR 1 DISORDER (HCC): ICD-10-CM

## 2018-06-19 PROCEDURE — 90834 PSYTX W PT 45 MINUTES: CPT | Performed by: SOCIAL WORKER

## 2018-06-19 NOTE — BH THERAPY
[facility]  Therapy Progress Note    Patient Name: Michelle Ziegler  Patient MRN: [id number]  Today's Date: 6/19/2018     Type of session:Individual psychotherapy  Length of session: 45 minutes  Persons in attendance:Patient    Subjective/New Info: Client reported her greatgrandmother passed away. It has been hard, but she stated she felt she was handling her gref well. She stated she realized that she conceptualizes her mental health in terms of winning and losing, which sets her up to fail much of the time. 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 Psychoeducation on thoughts and feelings, including not making feelings thoughts, and not making thoughts feelings.     Objective/Observations:   Participation: Active verbal participation, Attentive, Engaged and Open to feedback   Grooming: Good and Casual   Cognition: Alert and Fully Oriented   Eye contact: Good   Mood: Congruent with content   Affect: Congruent with content   Thought process: Logical and Goal-directed   Speech: Rate within normal limits and Volume within normal limits   Other:     Diagnoses:   1. Bipolar 1 disorder (HCC)         Current risk:   SUICIDE: Low   Homicide: Not applicable   Self-harm: Low   Relapse: Not applicable   Other:    Safety Plan reviewed? Not Indicated   If evidence of imminent risk is present, intervention/plan:     Therapeutic Intervention(s): Psychoeducation RE: thoughts and feelings    Treatment Goal(s)/Objective(s) addressed:   1) Increase self-compassion.    2) Learn new ways to interact with anxiety using ACT skills and processes    Progress toward Treatment Goals: Mild improvement    Plan:  - Continue Individual therapy and Medication management  - Next appointment scheduled:  7/10/2018  - Patient is in agreement with the above plan:  YES    Mercedes Beckwith  6/19/2018

## 2018-07-10 ENCOUNTER — OFFICE VISIT (OUTPATIENT)
Dept: BEHAVIORAL HEALTH | Facility: PHYSICIAN GROUP | Age: 23
End: 2018-07-10
Payer: COMMERCIAL

## 2018-07-10 DIAGNOSIS — F31.9 BIPOLAR 1 DISORDER (HCC): ICD-10-CM

## 2018-07-10 PROCEDURE — 90834 PSYTX W PT 45 MINUTES: CPT | Performed by: SOCIAL WORKER

## 2018-07-10 NOTE — BH THERAPY
" [facility]  Therapy Progress Note    Patient Name: Michelle Ziegler  Patient MRN: [id number]  Today's Date: 7/10/2018     Type of session:Individual psychotherapy  Length of session: 45 minutes  Persons in attendance:Patient    Subjective/New Info: Client reported that she had to put her dog down last week, and she is struggling. Helped client explore and normalize her pain. Discussed client's emotional growth with all of the things she has recently experienced. \"I am really getting to know my feelings.\" Used therapeutic metaphor to help client understand what she is experiencing. Provided encouragement and validation of difficult feelings.     Objective/Observations:   Participation: Active verbal participation, Attentive, Engaged and Open to feedback   Grooming: Good and Casual   Cognition: Alert and Fully Oriented   Eye contact: Good   Mood: Congruent with content    Affect: Congruent with mood   Thought process: Logical and Goal-directed   Speech: Rate within normal limits and Volume within normal limits   Other:     Diagnoses:   1. Bipolar 1 disorder (HCC)         Current risk:   SUICIDE: Low   Homicide: Not applicable   Self-harm: Not applicable   Relapse: Not applicable   Other:    Safety Plan reviewed? Not Indicated   If evidence of imminent risk is present, intervention/plan:     Therapeutic Intervention(s): Supportive psychotherapy    Treatment Goal(s)/Objective(s) addressed:   1) Increase self-compassion.    2) Learn new ways to interact with anxiety using ACT skills and processes    Progress toward Treatment Goals: Mild improvement    Plan:  - Continue Individual therapy and Medication management  - Next appointment scheduled:  7/26/2018  - Patient is in agreement with the above plan:  YES    Mercedes Beckwith  7/10/2018                                 "

## 2018-07-26 ENCOUNTER — OFFICE VISIT (OUTPATIENT)
Dept: BEHAVIORAL HEALTH | Facility: PHYSICIAN GROUP | Age: 23
End: 2018-07-26
Payer: COMMERCIAL

## 2018-07-26 DIAGNOSIS — F31.9 BIPOLAR 1 DISORDER (HCC): ICD-10-CM

## 2018-07-26 PROCEDURE — 90834 PSYTX W PT 45 MINUTES: CPT | Performed by: SOCIAL WORKER

## 2018-07-26 NOTE — BH THERAPY
[facility]  Therapy Progress Note    Patient Name: Michelle Ziegler  Patient MRN: [id number]  Today's Date: 7/26/2018     Type of session:Individual psychotherapy  Length of session: 45 minutes  Persons in attendance:Patient    Subjective/New Info: Client reported she is doing a little better. She started back at work. Spend time talking about the stories client has for herself, and how when she does not luna up to the story about her life, or continues to reenact the story of her self, she feels depressed and low. Gave client homework of taking a mindful look at her stories.     Objective/Observations:   Participation: Active verbal participation, Attentive, Engaged and Open to feedback   Grooming: Good and Casual   Cognition: Alert and Fully Oriented   Eye contact: Good   Mood: Euthymic   Affect: Flexible and Congruent with content   Thought process: Logical and Goal-directed   Speech: Rate within normal limits and Volume within normal limits   Other:     Diagnoses:   1. Bipolar 1 disorder (HCC)         Current risk:   SUICIDE: Low   Homicide: Not applicable   Self-harm: Not applicable   Relapse: Not applicable   Other:    Safety Plan reviewed? Not Indicated   If evidence of imminent risk is present, intervention/plan:     Therapeutic Intervention(s): Clarify:  Clarify feelings and Clarify thoughts and Supportive psychotherapy    Treatment Goal(s)/Objective(s) addressed:   1) Increase self-compassion.    2) Learn new ways to interact with anxiety using ACT skills and processes    Progress toward Treatment Goals: Mild improvement    Plan:  - Continue Individual therapy  - Next appointment scheduled:  8/16/2018  - Patient is in agreement with the above plan:  YES    Mercedes Beckwith  7/26/2018

## 2018-08-16 ENCOUNTER — OFFICE VISIT (OUTPATIENT)
Dept: BEHAVIORAL HEALTH | Facility: PHYSICIAN GROUP | Age: 23
End: 2018-08-16
Payer: COMMERCIAL

## 2018-08-16 DIAGNOSIS — F31.9 BIPOLAR 1 DISORDER (HCC): ICD-10-CM

## 2018-08-16 PROCEDURE — 90834 PSYTX W PT 45 MINUTES: CPT | Performed by: SOCIAL WORKER

## 2018-08-16 NOTE — BH THERAPY
[facility]  Therapy Progress Note    Patient Name: Michelle Ziegler  Patient MRN: [id number]  Today's Date: 8/16/2018     Type of session:Individual psychotherapy  Length of session: 45 minutes  Persons in attendance:Patient    Subjective/New Info: Client reported she is still processing her grief about her dog, but has learned that if she just allows it, the feelings go rather quickly. She talked about her feeling that she is somehow less than, and how she holds herself to a very high standard. This increases her anxiety and depression, as she feels she is always failing at something, even if she can't identify what it is. Client processed what her current position means, and was able to develop some insight into how she has gained from not going to grad school right away, and how she has been able to focus on her personal growth. She has been working on identifying her personal stories, and identifying ways she perpetuates them.     Objective/Observations:   Participation: Active verbal participation and Engaged   Grooming: Good and Casual   Cognition: Alert and Fully Oriented   Eye contact: Good   Mood: Euthymic   Affect: Congruent with content   Thought process: Logical and Goal-directed   Speech: Rate within normal limits and Volume within normal limits   Other:     Diagnoses:   1. Bipolar 1 disorder (HCC)         Current risk:   SUICIDE: Low   Homicide: Not applicable   Self-harm: Not applicable   Relapse: Not applicable   Other:    Safety Plan reviewed? Not Indicated   If evidence of imminent risk is present, intervention/plan:     Therapeutic Intervention(s): Stressors assessed, Supportive psychotherapy and Therapeutic relationship    Treatment Goal(s)/Objective(s) addressed:   1) Increase self-compassion.    2) Learn new ways to interact with anxiety using ACT skills and processes    Progress toward Treatment Goals: Mild improvement    Plan:  - Continue Individual therapy  - Next appointment  scheduled:  9/13/2018  - Patient is in agreement with the above plan:  YES    Mercedes Beckwith  8/16/2018

## 2018-09-13 ENCOUNTER — OFFICE VISIT (OUTPATIENT)
Dept: BEHAVIORAL HEALTH | Facility: PHYSICIAN GROUP | Age: 23
End: 2018-09-13
Payer: COMMERCIAL

## 2018-09-13 DIAGNOSIS — F31.9 BIPOLAR 1 DISORDER (HCC): ICD-10-CM

## 2018-09-13 PROCEDURE — 90834 PSYTX W PT 45 MINUTES: CPT | Performed by: SOCIAL WORKER

## 2018-09-13 NOTE — BH THERAPY
" [facility]  Therapy Progress Note    Patient Name: Michelle Ziegler  Patient MRN: [id number]  Today's Date: 9/13/2018     Type of session:Individual psychotherapy  Length of session: 45 minutes  Persons in attendance:Patient    Subjective/New Info: Client reported that a lot has stephan going on. She had a blow out with her brother and sister in law. \"I normally would have just tried to avoid everything and apologized for something I didn't even do. But I stood my ground, and they ended up apologizing to me.\" She reported she feels pretty good. Her relationship is winding down, but she feels ready to let it go. \"It has been a good month. A lot going on, but good.\"     Objective/Observations:   Participation: Active verbal participation, Attentive, Engaged and Open to feedback   Grooming: Good and Casual   Cognition: Alert and Fully Oriented   Eye contact: Good   Mood: Euthymic   Affect: Flexible and Congruent with content   Thought process: Logical and Goal-directed   Speech: Rate within normal limits and Volume within normal limits   Other:     Diagnoses:   1. Bipolar 1 disorder (HCC)         Current risk:   SUICIDE: Low   Homicide: Not applicable   Self-harm: Not applicable   Relapse: Not applicable   Other:    Safety Plan reviewed? Not Indicated   If evidence of imminent risk is present, intervention/plan:     Therapeutic Intervention(s): Supportive psychotherapy    Treatment Goal(s)/Objective(s) addressed:   1) Increase self-compassion.    2) Learn new ways to interact with anxiety using ACT skills and processes    Progress toward Treatment Goals: Mild improvement    Plan:  - Continue Individual therapy and Medication management  - Next appointment scheduled:  9/18/2018  - Patient is in agreement with the above plan:  YES    Mercedes Beckwith L.C.S.W.  9/13/2018                                 "

## 2018-09-18 ENCOUNTER — OFFICE VISIT (OUTPATIENT)
Dept: BEHAVIORAL HEALTH | Facility: PHYSICIAN GROUP | Age: 23
End: 2018-09-18
Payer: COMMERCIAL

## 2018-09-18 VITALS
BODY MASS INDEX: 44.3 KG/M2 | HEART RATE: 72 BPM | DIASTOLIC BLOOD PRESSURE: 82 MMHG | HEIGHT: 63 IN | WEIGHT: 250 LBS | SYSTOLIC BLOOD PRESSURE: 122 MMHG

## 2018-09-18 DIAGNOSIS — F41.1 GAD (GENERALIZED ANXIETY DISORDER): ICD-10-CM

## 2018-09-18 DIAGNOSIS — F31.70 BIPOLAR AFFECTIVE DISORDER IN REMISSION (HCC): ICD-10-CM

## 2018-09-18 PROCEDURE — 99213 OFFICE O/P EST LOW 20 MIN: CPT | Mod: GC | Performed by: PSYCHIATRY & NEUROLOGY

## 2018-09-18 RX ORDER — LAMOTRIGINE 100 MG/1
TABLET ORAL
Qty: 75 TAB | Refills: 3 | Status: SHIPPED | OUTPATIENT
Start: 2018-09-18 | End: 2018-12-21 | Stop reason: SDUPTHER
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
## 2018-09-19 PROBLEM — F41.1 GAD (GENERALIZED ANXIETY DISORDER): Status: ACTIVE | Noted: 2018-09-19

## 2018-09-19 PROBLEM — F31.70 BIPOLAR AFFECTIVE DISORDER IN REMISSION (HCC): Status: ACTIVE | Noted: 2017-09-05

## 2018-10-11 ENCOUNTER — OFFICE VISIT (OUTPATIENT)
Dept: BEHAVIORAL HEALTH | Facility: CLINIC | Age: 23
End: 2018-10-11
Payer: COMMERCIAL

## 2018-10-11 DIAGNOSIS — F31.9 BIPOLAR 1 DISORDER (HCC): ICD-10-CM

## 2018-10-11 DIAGNOSIS — F41.1 GAD (GENERALIZED ANXIETY DISORDER): ICD-10-CM

## 2018-10-11 PROCEDURE — 90834 PSYTX W PT 45 MINUTES: CPT | Performed by: SOCIAL WORKER

## 2018-10-25 ENCOUNTER — OFFICE VISIT (OUTPATIENT)
Dept: BEHAVIORAL HEALTH | Facility: CLINIC | Age: 23
End: 2018-10-25
Payer: COMMERCIAL

## 2018-10-25 DIAGNOSIS — F31.9 BIPOLAR 1 DISORDER (HCC): ICD-10-CM

## 2018-10-25 DIAGNOSIS — F41.1 GAD (GENERALIZED ANXIETY DISORDER): ICD-10-CM

## 2018-10-25 PROCEDURE — 90834 PSYTX W PT 45 MINUTES: CPT | Performed by: SOCIAL WORKER

## 2018-10-25 NOTE — BH THERAPY
" [facility]  Therapy Progress Note    Patient Name: Michelle Ziegler  Patient MRN: [id number]  Today's Date: 10/25/2018     Type of session:Individual psychotherapy  Length of session: 45 minutes  Persons in attendance:Patient    Subjective/New Info: Client reported she has stephan working on being nicer to herself. \"It's working.\" She reported she has been able to feel and tolerate uncomfortable feelings better, and when she does they pass faster. \"I feel like I am making progress. It's nice.\"    Objective/Observations:   Participation: Active verbal participation, Attentive, Engaged and Open to feedback   Grooming: Good and Casual   Cognition: Alert and Fully Oriented   Eye contact: Good   Mood: Euthymic   Affect: Flexible   Thought process: Logical and Goal-directed   Speech: Rate within normal limits and Volume within normal limits   Other:     Diagnoses:   1. LUIS (generalized anxiety disorder)    2. Bipolar 1 disorder (HCC)         Current risk:   SUICIDE: Low   Homicide: Not applicable   Self-harm: Not applicable   Relapse: Not applicable   Other:    Safety Plan reviewed? Not Indicated   If evidence of imminent risk is present, intervention/plan:     Therapeutic Intervention(s): Supportive psychotherapy and Therapeutic relationship    Treatment Goal(s)/Objective(s) addressed:   1) Increase self-compassion.    2) Learn new ways to interact with anxiety using ACT skills and processes    Progress toward Treatment Goals: Moderate improvement    Plan:  - Continue Individual therapy and Medication management  - Next appointment scheduled:  11/9/2018  - Patient is in agreement with the above plan:  YES    Mercedes Beckwith L.C.S.W.  10/25/2018                                   "

## 2018-11-09 ENCOUNTER — OFFICE VISIT (OUTPATIENT)
Dept: BEHAVIORAL HEALTH | Facility: CLINIC | Age: 23
End: 2018-11-09
Payer: COMMERCIAL

## 2018-11-09 DIAGNOSIS — F31.9 BIPOLAR 1 DISORDER (HCC): ICD-10-CM

## 2018-11-09 DIAGNOSIS — F41.1 GAD (GENERALIZED ANXIETY DISORDER): ICD-10-CM

## 2018-11-09 PROCEDURE — 90834 PSYTX W PT 45 MINUTES: CPT | Performed by: SOCIAL WORKER

## 2018-11-09 NOTE — BH THERAPY
" [facility]  Therapy Progress Note    Patient Name: Michelle Ziegler  Patient MRN: [id number]  Today's Date: 11/9/2018     Type of session:Individual psychotherapy  Length of session: 45 minutes  Persons in attendance:Patient    Subjective/New Info: Client reported a rough time with her mom. She spent the session processing her lifelong feelings of rejection from her mother, including most recently her mothers comments about ct's comments about her sexuality. \"I told her Bailey and I broke up, and she seemed happy. She is ready to  me off to a man and have me start reproducing.\" She processed well, and realized that her mother has always been conditional with her love. Discussed boundaries vs. cutting her off completely.      Objective/Observations:   Participation: Active verbal participation, Verbally monopolizing, Attentive, Engaged and Open to feedback   Grooming: Good and Casual   Cognition: Alert and Fully Oriented   Eye contact: Good   Mood: Irritable   Affect: Congruent with content   Thought process: Logical and Goal-directed   Speech: Rate within normal limits and Volume within normal limits   Other:     Diagnoses:   1. LUIS (generalized anxiety disorder)    2. Bipolar 1 disorder (HCC)         Current risk:   SUICIDE: Low   Homicide: Not applicable   Self-harm: Not applicable   Relapse: Not applicable   Other:    Safety Plan reviewed? Not Indicated   If evidence of imminent risk is present, intervention/plan:     Therapeutic Intervention(s): Supportive psychotherapy    Treatment Goal(s)/Objective(s) addressed:   1) Increase self-compassion.    2) Learn new ways to interact with anxiety using ACT skills and processes    Progress toward Treatment Goals: Mild improvement    Plan:  - Continue Individual therapy and Medication management  - Next appointment scheduled:  11/29/2018  - Patient is in agreement with the above plan:  YES    Mercedes Beckwith, " KIP  11/9/2018

## 2018-11-24 ENCOUNTER — OFFICE VISIT (OUTPATIENT)
Dept: URGENT CARE | Facility: PHYSICIAN GROUP | Age: 23
End: 2018-11-24
Payer: COMMERCIAL

## 2018-11-24 VITALS
HEIGHT: 63 IN | RESPIRATION RATE: 16 BRPM | DIASTOLIC BLOOD PRESSURE: 64 MMHG | OXYGEN SATURATION: 96 % | HEART RATE: 92 BPM | WEIGHT: 254 LBS | TEMPERATURE: 98.5 F | SYSTOLIC BLOOD PRESSURE: 104 MMHG | BODY MASS INDEX: 45 KG/M2

## 2018-11-24 DIAGNOSIS — S39.012A BACK STRAIN, INITIAL ENCOUNTER: ICD-10-CM

## 2018-11-24 PROCEDURE — 99213 OFFICE O/P EST LOW 20 MIN: CPT | Performed by: EMERGENCY MEDICINE

## 2018-11-24 RX ORDER — METHOCARBAMOL 750 MG/1
1500 TABLET, FILM COATED ORAL 3 TIMES DAILY
Qty: 60 TAB | Refills: 0 | Status: SHIPPED | OUTPATIENT
Start: 2018-11-24 | End: 2018-12-04

## 2018-11-24 NOTE — LETTER
November 24, 2018        Michelle Ziegler  [address]  [place] [address]        Dear Michelle:    Please ask to be excused from work today and the next 1-2 days off for medical reasons.    If you have any questions or concerns, please don't hesitate to call.        Sincerely,        Alfonzo Rothman M.D.    Electronically Signed

## 2018-11-25 ASSESSMENT — ENCOUNTER SYMPTOMS
NECK PAIN: 0
BACK PAIN: 1
SPEECH CHANGE: 0
HEMOPTYSIS: 0
NAUSEA: 0
FEVER: 0
NERVOUS/ANXIOUS: 0
ABDOMINAL PAIN: 0
EYE DISCHARGE: 0
COUGH: 0
EYE REDNESS: 0
MYALGIAS: 1
VOMITING: 0
SENSORY CHANGE: 0

## 2018-11-25 NOTE — PROGRESS NOTES
"Subjective:      Michelle Ziegler is a 23 y.o. female who presents with Back Pain (back pain x 4days)            HPI  Patient is a pleasant 23-year-old female complaining of back pain for the past 4 days.  Patient states this is not work related she denies any fever chills nausea vomiting or diarrhea.    PMH:  has a past medical history of Bipolar disorder (HCC) and Depression.  MEDS:   Current Outpatient Prescriptions:   •  methocarbamol (ROBAXIN) 750 MG Tab, Take 2 Tabs by mouth 3 times a day for 10 days., Disp: 60 Tab, Rfl: 0  •  lamoTRIgine (LAMICTAL) 100 MG Tab, Take 100mg PO QAM and 150mg PO QHS, Disp: 75 Tab, Rfl: 3  ALLERGIES:   Allergies   Allergen Reactions   • Ceclor [Cefaclor] Vomiting   • Rocephin [Ceftriaxone Sodium] Rash     As infant     SURGHX: No past surgical history on file.  SOCHX:  reports that she has never smoked. She has never used smokeless tobacco. She reports that she does not drink alcohol or use drugs.  FH: family history includes Depression in her maternal grandmother and mother; Schizophrenia in her maternal grandfather; Suicide Attempts in her paternal grandfather.  Review of Systems   Constitutional: Negative for fever.   Eyes: Negative for discharge and redness.   Respiratory: Negative for cough and hemoptysis.    Cardiovascular: Negative for chest pain.   Gastrointestinal: Negative for abdominal pain, nausea and vomiting.   Musculoskeletal: Positive for back pain and myalgias. Negative for joint pain and neck pain.   Skin: Negative for rash.   Neurological: Negative for sensory change and speech change.   Psychiatric/Behavioral: The patient is not nervous/anxious.           Objective:     /64 (BP Location: Left arm, Patient Position: Sitting, BP Cuff Size: Adult)   Pulse 92   Temp 36.9 °C (98.5 °F) (Temporal)   Resp 16   Ht 1.6 m (5' 3\")   Wt 115.2 kg (254 lb)   SpO2 96%   BMI 44.99 kg/m²      Physical Exam   Constitutional: She appears well-developed and " well-nourished. No distress.   HENT:   Head: Normocephalic and atraumatic.   Right Ear: External ear normal.   Left Ear: External ear normal.   Eyes: Conjunctivae are normal. Right eye exhibits no discharge. Left eye exhibits no discharge.   Neck: Normal range of motion.   Cardiovascular: Normal rate and regular rhythm.    Pulmonary/Chest: Effort normal and breath sounds normal. No respiratory distress.   Abdominal: She exhibits no distension.   Musculoskeletal: Normal range of motion.   Patient complains of diffuse low back pain.  Straight leg raise is normal bilaterally reflexes are symmetric with 5+/5 dorsiflexion.  Patient has no rash on her back.   Neurological: She is alert.   Skin: Skin is warm. No rash noted. She is not diaphoretic. No erythema.   Psychiatric: She has a normal mood and affect. Her behavior is normal.   Nursing note and vitals reviewed.              Assessment/Plan:     1. Back strain, initial encounter    - methocarbamol (ROBAXIN) 750 MG Tab; Take 2 Tabs by mouth 3 times a day for 10 days.  Dispense: 60 Tab; Refill: 0        Patient will watch for bowel or bladder incontinence return immediately if it develops.  Recommend she try Robaxin in conjunction with heat and anti-inflammatories such as Advil 400-600 mg p.o. with meals every 6 hours.

## 2018-11-29 ENCOUNTER — OFFICE VISIT (OUTPATIENT)
Dept: BEHAVIORAL HEALTH | Facility: CLINIC | Age: 23
End: 2018-11-29
Payer: COMMERCIAL

## 2018-11-29 DIAGNOSIS — F41.1 GAD (GENERALIZED ANXIETY DISORDER): ICD-10-CM

## 2018-11-29 DIAGNOSIS — F31.9 BIPOLAR 1 DISORDER (HCC): ICD-10-CM

## 2018-11-29 PROCEDURE — 90834 PSYTX W PT 45 MINUTES: CPT | Performed by: SOCIAL WORKER

## 2018-11-29 NOTE — BH THERAPY
" [facility]  Therapy Progress Note    Patient Name: Michelle Ziegler  Patient MRN: [id number]  Today's Date: 11/29/2018     Type of session:Individual psychotherapy  Length of session: 45 minutes  Persons in attendance:Patient    Subjective/New Info: Client reported she has been working on her relationship with herself, including exploring her boundaries and her limiting beliefs of living in a binary. Discussed client's ideas around success and failure. Had client complete a values clarification exercise, and look at the ways her story impacts her living her values. Assigned homework of client reviewing her top 10 values and identifying ways she lives these values, and ways her story gets in the way of these values.     Objective/Observations:   Participation: Active verbal participation   Grooming: Good and Casual   Cognition: Alert and Fully Oriented   Eye contact: Good   Mood: Euthymic   Affect: Flexible   Thought process: Logical and Goal-directed   Speech: Rate within normal limits and Volume within normal limits   Other:     Diagnoses:   1. LUIS (generalized anxiety disorder)    2. Bipolar 1 disorder (HCC)         Current risk:   SUICIDE: Low   Homicide: Not applicable   Self-harm: Not applicable   Relapse: Not applicable   Other:    Safety Plan reviewed? Not Indicated   If evidence of imminent risk is present, intervention/plan:     Therapeutic Intervention(s): Clarify:  Clarify values and Supportive psychotherapy    Treatment Goal(s)/Objective(s) addressed:   1) Increase self-compassion.    2) Learn new ways to interact with anxiety using ACT skills and processes    Progress toward Treatment Goals: Moderate improvement    Plan:  - Continue Individual therapy and Medication management  - \"Homework\" recommendation: Values work  - Next appointment scheduled:  12/13/2018  - Patient is in agreement with the above plan:  YES    Mercedes Beckwith L.C.S.W.  11/29/2018                                 "

## 2018-12-13 ENCOUNTER — OFFICE VISIT (OUTPATIENT)
Dept: BEHAVIORAL HEALTH | Facility: CLINIC | Age: 23
End: 2018-12-13
Payer: COMMERCIAL

## 2018-12-13 DIAGNOSIS — F31.9 BIPOLAR 1 DISORDER (HCC): ICD-10-CM

## 2018-12-13 DIAGNOSIS — F41.1 GAD (GENERALIZED ANXIETY DISORDER): ICD-10-CM

## 2018-12-13 PROCEDURE — 90834 PSYTX W PT 45 MINUTES: CPT | Performed by: SOCIAL WORKER

## 2018-12-13 NOTE — BH THERAPY
" [facility]  Therapy Progress Note    Patient Name: Michelle Ziegler  Patient MRN: [id number]  Today's Date: 12/13/2018     Type of session:Individual psychotherapy  Length of session: 45 minutes  Persons in attendance:Patient    Subjective/New Info: Client reported her sister-in-law had her baby, and she was at the birth. \"I've been up for over 24 hours!\" She reported she was able to put aside her conflict with her brother and FEI and be present in the room and come from a place of love. Clinician helped client gain the insight of choosing love over choosing being right. \"I never realized how much I choose being right over everything else. But I didn't this time. I choose love, and it was amazing.\" She is beginning to put together the various skills and insights she has had over her course of treatment to make more authentic choices about her life. She reported current anxiety, but lack of sleep and high caffeine ingestion could have been at work.      Objective/Observations:   Participation: Active verbal participation and Engaged   Grooming: Good and Casual   Cognition: Fully Oriented and Tired, as she had been up for 24 hours   Eye contact: Good   Mood: Euthymic   Affect: Flexible   Thought process: Logical and Goal-directed   Speech: Rate within normal limits and Volume within normal limits   Other:     Diagnoses:   1. LUIS (generalized anxiety disorder)    2. Bipolar 1 disorder (HCC)         Current risk:   SUICIDE: Low   Homicide: Not applicable   Self-harm: Not applicable   Relapse: Not applicable   Other:    Safety Plan reviewed? Not Indicated   If evidence of imminent risk is present, intervention/plan:     Therapeutic Intervention(s): Clarify:  Clarify feelings, Clarify thoughts and Clarify values, Supportive psychotherapy and Therapeutic relationship    Treatment Goal(s)/Objective(s) addressed:   1) Increase self-compassion.    2) Learn new ways to interact with anxiety using ACT skills and " processes    Progress toward Treatment Goals: Moderate improvement    Plan:  - Continue Individual therapy and Medication management  - Next appointment scheduled:  12/21/2018  - Patient is in agreement with the above plan:  YES    SINDI GarcesSOKSANA  12/13/2018

## 2018-12-21 ENCOUNTER — OFFICE VISIT (OUTPATIENT)
Dept: BEHAVIORAL HEALTH | Facility: CLINIC | Age: 23
End: 2018-12-21
Payer: COMMERCIAL

## 2018-12-21 VITALS
SYSTOLIC BLOOD PRESSURE: 126 MMHG | HEIGHT: 62 IN | DIASTOLIC BLOOD PRESSURE: 58 MMHG | WEIGHT: 261 LBS | BODY MASS INDEX: 48.03 KG/M2 | HEART RATE: 78 BPM

## 2018-12-21 DIAGNOSIS — F41.1 GAD (GENERALIZED ANXIETY DISORDER): ICD-10-CM

## 2018-12-21 DIAGNOSIS — F31.70 BIPOLAR AFFECTIVE DISORDER IN REMISSION (HCC): ICD-10-CM

## 2018-12-21 PROCEDURE — 99213 OFFICE O/P EST LOW 20 MIN: CPT | Mod: GC | Performed by: PSYCHIATRY & NEUROLOGY

## 2018-12-21 RX ORDER — LAMOTRIGINE 100 MG/1
TABLET ORAL
Qty: 75 TAB | Refills: 4 | Status: SHIPPED | OUTPATIENT
Start: 2018-12-21 | End: 2019-03-18 | Stop reason: SDUPTHER

## 2018-12-21 ASSESSMENT — ENCOUNTER SYMPTOMS
CHILLS: 0
NAUSEA: 0
VOMITING: 0
DIARRHEA: 0
FEVER: 0
WEIGHT LOSS: 0
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
## 2019-01-03 ENCOUNTER — OFFICE VISIT (OUTPATIENT)
Dept: BEHAVIORAL HEALTH | Facility: CLINIC | Age: 24
End: 2019-01-03
Payer: COMMERCIAL

## 2019-01-03 DIAGNOSIS — F31.9 BIPOLAR 1 DISORDER (HCC): ICD-10-CM

## 2019-01-03 DIAGNOSIS — F41.1 GAD (GENERALIZED ANXIETY DISORDER): ICD-10-CM

## 2019-01-03 PROCEDURE — 90834 PSYTX W PT 45 MINUTES: CPT | Performed by: SOCIAL WORKER

## 2019-01-03 NOTE — BH THERAPY
[facility]  Therapy Progress Note    Patient Name: Michelle Ziegler  Patient MRN: [id number]  Today's Date: 1/3/2019     Type of session:Individual psychotherapy  Length of session: 45 minutes  Persons in attendance:Patient    Subjective/New Info: Client reported she is doing well. She has some goals set for the new year, including getting a new job in her field of choice, and applying to grad school. She reported overall feelings of stability and hope. She has some trips planned for the year, and is excited about her possibilities. She reported limited mood or anxiety disturbances.     Objective/Observations:   Participation: Active verbal participation, Attentive and Engaged   Grooming: Good and Casual   Cognition: Alert and Fully Oriented   Eye contact: Good   Mood: Euthymic   Affect: Flexible   Thought process: Logical and Goal-directed   Speech: Rate within normal limits and Volume within normal limits   Other:     Diagnoses:   1. LUIS (generalized anxiety disorder)    2. Bipolar 1 disorder (HCC)         Current risk:   SUICIDE: Low   Homicide: Not applicable   Self-harm: Not applicable   Relapse: Not applicable   Other:    Safety Plan reviewed? Not Indicated   If evidence of imminent risk is present, intervention/plan:     Therapeutic Intervention(s): Supportive psychotherapy    Treatment Goal(s)/Objective(s) addressed:   1) Increase self-compassion.    2) Learn new ways to interact with anxiety using ACT skills and processes    Progress toward Treatment Goals: Moderate improvement    Plan:  - Continue Individual therapy and Medication management  - Next appointment scheduled:  1/24/2019  - Patient is in agreement with the above plan:  YES    Mercedes Beckwith L.C.S.W.  1/3/2019

## 2019-01-24 ENCOUNTER — OFFICE VISIT (OUTPATIENT)
Dept: BEHAVIORAL HEALTH | Facility: CLINIC | Age: 24
End: 2019-01-24
Payer: COMMERCIAL

## 2019-01-24 DIAGNOSIS — F31.9 BIPOLAR 1 DISORDER (HCC): ICD-10-CM

## 2019-01-24 DIAGNOSIS — F41.1 GAD (GENERALIZED ANXIETY DISORDER): ICD-10-CM

## 2019-01-24 PROCEDURE — 90834 PSYTX W PT 45 MINUTES: CPT | Performed by: SOCIAL WORKER

## 2019-01-24 NOTE — BH THERAPY
[facility]  Therapy Progress Note    Patient Name: Michelle Ziegler  Patient MRN: [id number]  Today's Date: 1/24/2019     Type of session:Individual psychotherapy  Length of session: 45 minutes  Persons in attendance:Patient    Subjective/New Info: Client reported she got a new job with [employer]. She isn't sure what she will be doing, but it will be more in line with her goals and values. Discussed goals vs values. Client reported she has been very goal oriented her whole life, and is working on switching to a values orientation.     Objective/Observations:   Participation: Active verbal participation, Attentive, Engaged and Open to feedback   Grooming: Good and Casual   Cognition: Alert and Fully Oriented   Eye contact: Good   Mood: Euthymic   Affect: Congruent with content   Thought process: Logical and Goal-directed   Speech: Rate within normal limits and Volume within normal limits   Other:     Diagnoses:   1. LUIS (generalized anxiety disorder)    2. Bipolar 1 disorder (HCC)         Current risk:   SUICIDE: Low   Homicide: Not applicable   Self-harm: Not applicable   Relapse: Not applicable   Other:    Safety Plan reviewed? Not Indicated   If evidence of imminent risk is present, intervention/plan:     Therapeutic Intervention(s): Psychoeducation RE: Goals vs values    Treatment Goal(s)/Objective(s) addressed:   1) Increase self-compassion.    2) Learn new ways to interact with anxiety using ACT skills and processes    Progress toward Treatment Goals: Moderate improvement    Plan:  - Continue Individual therapy and Medication management  - Next appointment scheduled:  2/7/2019  - Patient is in agreement with the above plan:  YES    RIKA Garces.C.SALETA.  1/24/2019

## 2019-01-28 ENCOUNTER — NON-PROVIDER VISIT (OUTPATIENT)
Dept: URGENT CARE | Facility: PHYSICIAN GROUP | Age: 24
End: 2019-01-28

## 2019-01-28 DIAGNOSIS — Z11.1 PPD SCREENING TEST: ICD-10-CM

## 2019-01-28 PROCEDURE — 86580 TB INTRADERMAL TEST: CPT | Performed by: EMERGENCY MEDICINE

## 2019-01-31 ENCOUNTER — NON-PROVIDER VISIT (OUTPATIENT)
Dept: URGENT CARE | Facility: PHYSICIAN GROUP | Age: 24
End: 2019-01-31
Payer: COMMERCIAL

## 2019-01-31 LAB — TB WHEAL 3D P 5 TU DIAM: NORMAL MM

## 2019-02-07 ENCOUNTER — OFFICE VISIT (OUTPATIENT)
Dept: BEHAVIORAL HEALTH | Facility: CLINIC | Age: 24
End: 2019-02-07
Payer: COMMERCIAL

## 2019-02-07 DIAGNOSIS — F41.1 GAD (GENERALIZED ANXIETY DISORDER): ICD-10-CM

## 2019-02-07 DIAGNOSIS — F31.9 BIPOLAR 1 DISORDER (HCC): ICD-10-CM

## 2019-02-07 PROCEDURE — 90834 PSYTX W PT 45 MINUTES: CPT | Performed by: SOCIAL WORKER

## 2019-02-08 NOTE — BH THERAPY
" [facility]  Therapy Progress Note    Patient Name: Michelle Ziegler  Patient MRN: [id number]  Today's Date: 2/7/2019     Type of session:Individual psychotherapy  Length of session: 45 minutes  Persons in attendance:Patient    Subjective/New Info: Client arrived on time and ready to work. She reported she started her new job and really enjoys it. She has been having nightmares for a week now about finding her brother's dead body. She reported feeling very anxious about his wellbeing, but also harboring resentment toward him. \"I thought his life would turn out differently.\" Client processed the nature of her anger toward her brother, and the role expectations play in her life and interactions with others.     Objective/Observations:   Participation: Active verbal participation, Attentive, Engaged and Open to feedback   Grooming: Good and Casual   Cognition: Alert and Fully Oriented   Eye contact: Limited   Mood: Congruent with content    Affect: Congruent with mood   Thought process: Logical and Goal-directed   Speech: Rate within normal limits and Volume within normal limits   Other:     Diagnoses:   1. LUIS (generalized anxiety disorder)    2. Bipolar 1 disorder (HCC)         Current risk:   SUICIDE: Low   Homicide: Not applicable   Self-harm: Not applicable   Relapse: Not applicable   Other:    Safety Plan reviewed? Not Indicated   If evidence of imminent risk is present, intervention/plan:     Therapeutic Intervention(s): Person centered therapy, ACT skills, concepts and processes     Treatment Goal(s)/Objective(s) addressed:   1) Increase self-compassion.    2) Learn new ways to interact with anxiety using ACT skills and processes    Progress toward Treatment Goals: Mild improvement    Plan:  - Continue Individual therapy and Medication management  - Next appointment scheduled:  2/21/2019  - Patient is in agreement with the above plan:  YES    Mercedes Beckwith, " L.C.S.W.  2/7/2019

## 2019-02-21 ENCOUNTER — OFFICE VISIT (OUTPATIENT)
Dept: BEHAVIORAL HEALTH | Facility: CLINIC | Age: 24
End: 2019-02-21
Payer: COMMERCIAL

## 2019-02-21 DIAGNOSIS — F31.9 BIPOLAR 1 DISORDER (HCC): ICD-10-CM

## 2019-02-21 DIAGNOSIS — F41.1 GAD (GENERALIZED ANXIETY DISORDER): ICD-10-CM

## 2019-02-21 PROCEDURE — 90834 PSYTX W PT 45 MINUTES: CPT | Performed by: SOCIAL WORKER

## 2019-02-21 NOTE — BH THERAPY
[facility]  Therapy Progress Note    Patient Name: Michelle Ziegler  Patient MRN: [id number]  Today's Date: 2/21/2019     Type of session:Individual psychotherapy  Length of session: 45 minutes  Persons in attendance:Patient    Subjective/New Info: Client reported she was doing slightly better. Her nightmares about her brother stopped, and she has reached out and chatted with him. She processed a lot of her anger and anxiety about her relationship with him, and developed insight in to the nature of her feelings. She feels she somehow failed because she assumed responsibility for him when he was younger, and feels he isn't now living up to his potential. She is trying to work through her complicated feelings around her brother.     Objective/Observations:   Participation: Active verbal participation, Engaged and Open to feedback   Grooming: Good and Casual   Cognition: Alert and Fully Oriented   Eye contact: Limited   Mood: Anxious   Affect: Congruent with content   Thought process: Logical and Goal-directed   Speech: Rate within normal limits and Volume within normal limits   Other:     Diagnoses:   1. LUIS (generalized anxiety disorder)    2. Bipolar 1 disorder (HCC)         Current risk:   SUICIDE: Low   Homicide: Not applicable   Self-harm: Not applicable   Relapse: Not applicable   Other:    Safety Plan reviewed? Not Indicated   If evidence of imminent risk is present, intervention/plan:     Therapeutic Intervention(s): Stressors assessed and Supportive psychotherapy    Treatment Goal(s)/Objective(s) addressed:   1) Increase self-compassion.    2) Learn new ways to interact with anxiety using ACT skills and processes    Progress toward Treatment Goals: Mild improvement    Plan:  - Continue Individual therapy and Medication management  - Next appointment scheduled:  3/18/2019  - Patient is in agreement with the above plan:  YES    Mercedes Beckwith,  KIP  2/21/2019

## 2019-03-18 ENCOUNTER — OFFICE VISIT (OUTPATIENT)
Dept: BEHAVIORAL HEALTH | Facility: CLINIC | Age: 24
End: 2019-03-18
Payer: COMMERCIAL

## 2019-03-18 DIAGNOSIS — F31.9 BIPOLAR 1 DISORDER (HCC): ICD-10-CM

## 2019-03-18 DIAGNOSIS — F41.1 GAD (GENERALIZED ANXIETY DISORDER): ICD-10-CM

## 2019-03-18 PROCEDURE — 90834 PSYTX W PT 45 MINUTES: CPT | Performed by: SOCIAL WORKER

## 2019-03-18 RX ORDER — LAMOTRIGINE 100 MG/1
TABLET ORAL
Qty: 75 TAB | Refills: 4 | Status: SHIPPED | OUTPATIENT
Start: 2019-03-18 | End: 2019-05-10 | Stop reason: SDUPTHER
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
## 2019-04-01 ENCOUNTER — OFFICE VISIT (OUTPATIENT)
Dept: BEHAVIORAL HEALTH | Facility: CLINIC | Age: 24
End: 2019-04-01
Payer: COMMERCIAL

## 2019-04-01 DIAGNOSIS — F41.1 GAD (GENERALIZED ANXIETY DISORDER): ICD-10-CM

## 2019-04-01 DIAGNOSIS — F31.9 BIPOLAR 1 DISORDER (HCC): ICD-10-CM

## 2019-04-01 PROCEDURE — 90834 PSYTX W PT 45 MINUTES: CPT | Performed by: SOCIAL WORKER
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
## 2019-04-15 ENCOUNTER — OFFICE VISIT (OUTPATIENT)
Dept: BEHAVIORAL HEALTH | Facility: CLINIC | Age: 24
End: 2019-04-15
Payer: COMMERCIAL

## 2019-04-15 DIAGNOSIS — F41.1 GAD (GENERALIZED ANXIETY DISORDER): ICD-10-CM

## 2019-04-15 DIAGNOSIS — F31.9 BIPOLAR 1 DISORDER (HCC): ICD-10-CM

## 2019-04-15 PROCEDURE — 90834 PSYTX W PT 45 MINUTES: CPT | Performed by: SOCIAL WORKER

## 2019-04-15 NOTE — BH THERAPY
[facility]  Therapy Progress Note    Patient Name: Michelle Ziegler  Patient MRN: [id number]  Today's Date: 4/15/2019     Type of session:Individual psychotherapy  Length of session: 45 minutes  Persons in attendance:Patient    Subjective/New Info: Client reported she was doing okay. She struggled with some sadness and anxiety this past week due to some interpersonal relationship issues. Helped client process through her bigger feelings, and reminded her to use her boundaries to help maintain clear expectations for herself.    Objective/Observations:   Participation: Active verbal participation   Grooming: Good and Casual   Cognition: Alert and Fully Oriented   Eye contact: Limited   Mood: Anxious and Sad   Affect: Congruent with content   Thought process: Logical and Goal-directed   Speech: Rate within normal limits and Volume within normal limits   Other:     Diagnoses:   1. LUIS (generalized anxiety disorder)    2. Bipolar 1 disorder (HCC)         Current risk:   SUICIDE: Low   Homicide: Not applicable   Self-harm: Not applicable   Relapse: Not applicable   Other:    Safety Plan reviewed? Not Indicated   If evidence of imminent risk is present, intervention/plan:     Therapeutic Intervention(s): Supportive psychotherapy    Treatment Goal(s)/Objective(s) addressed:   1) Increase self-compassion.    2) Learn new ways to interact with anxiety using ACT skills and processes    Progress toward Treatment Goals: Mild improvement    Plan:  - Continue Individual therapy and Medication management  - Next appointment scheduled:  4/29/2019  - Patient is in agreement with the above plan:  YES    Mercedes Beckwith L.C.S.W.  4/15/2019

## 2019-04-29 ENCOUNTER — OFFICE VISIT (OUTPATIENT)
Dept: BEHAVIORAL HEALTH | Facility: CLINIC | Age: 24
End: 2019-04-29
Payer: COMMERCIAL

## 2019-04-29 DIAGNOSIS — F41.1 GAD (GENERALIZED ANXIETY DISORDER): ICD-10-CM

## 2019-04-29 DIAGNOSIS — F31.9 BIPOLAR 1 DISORDER (HCC): ICD-10-CM

## 2019-04-29 PROCEDURE — 90834 PSYTX W PT 45 MINUTES: CPT | Performed by: SOCIAL WORKER
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
## 2019-05-10 ENCOUNTER — OFFICE VISIT (OUTPATIENT)
Dept: BEHAVIORAL HEALTH | Facility: CLINIC | Age: 24
End: 2019-05-10
Payer: COMMERCIAL

## 2019-05-10 VITALS
HEART RATE: 84 BPM | HEIGHT: 62 IN | BODY MASS INDEX: 47.66 KG/M2 | DIASTOLIC BLOOD PRESSURE: 90 MMHG | WEIGHT: 259 LBS | SYSTOLIC BLOOD PRESSURE: 136 MMHG

## 2019-05-10 DIAGNOSIS — F31.70 BIPOLAR AFFECTIVE DISORDER IN REMISSION (HCC): ICD-10-CM

## 2019-05-10 DIAGNOSIS — F41.1 GAD (GENERALIZED ANXIETY DISORDER): ICD-10-CM

## 2019-05-10 RX ORDER — LAMOTRIGINE 100 MG/1
TABLET ORAL
Qty: 75 TAB | Refills: 5 | Status: SHIPPED | OUTPATIENT
Start: 2019-05-10 | End: 2019-07-16 | Stop reason: SDUPTHER

## 2019-05-10 ASSESSMENT — ENCOUNTER SYMPTOMS
DIARRHEA: 0
VOMITING: 0
CHILLS: 0
WEIGHT LOSS: 0
FEVER: 0
NAUSEA: 0
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
## 2019-05-13 ENCOUNTER — OFFICE VISIT (OUTPATIENT)
Dept: BEHAVIORAL HEALTH | Facility: CLINIC | Age: 24
End: 2019-05-13
Payer: COMMERCIAL

## 2019-05-13 DIAGNOSIS — F31.70 BIPOLAR AFFECTIVE DISORDER IN REMISSION (HCC): ICD-10-CM

## 2019-05-13 DIAGNOSIS — F41.1 GAD (GENERALIZED ANXIETY DISORDER): ICD-10-CM

## 2019-05-13 PROCEDURE — 90834 PSYTX W PT 45 MINUTES: CPT | Performed by: SOCIAL WORKER

## 2019-05-13 NOTE — BH THERAPY
[facility]  Therapy Progress Note    Patient Name: Michelle Ziegler  Patient MRN: [id number]  Today's Date: 5/13/2019     Type of session:Individual psychotherapy  Length of session: 45 minutes  Persons in attendance:Patient    Subjective/New Info: Client reported she is doing okay. She states she is still experiencing a lot of anger toward her mother over her comments about her dog. Pushed client to process her feelings about this situation. She was able to gain insight through processing, and notice that she holds high expectations and can stuck in her anger. Worked through some of these insights.     Objective/Observations:   Participation: Active verbal participation, Attentive and Engaged   Grooming: Good and Casual   Cognition: Alert and Fully Oriented   Eye contact: Good   Mood: Congruent with content    Affect: Flexible and Congruent with content   Thought process: Logical and Goal-directed   Speech: Rate within normal limits and Volume within normal limits   Other:     Diagnoses:   1. LUIS (generalized anxiety disorder)    2. Bipolar affective disorder in remission (HCC)         Current risk:   SUICIDE: Low   Homicide: Not applicable   Self-harm: Not applicable   Relapse: Not applicable   Other:    Safety Plan reviewed? Not Indicated   If evidence of imminent risk is present, intervention/plan:     Therapeutic Intervention(s): Stressors assessed and Supportive psychotherapy    Treatment Goal(s)/Objective(s) addressed:   1) Increase self-compassion.    2) Learn new ways to interact with anxiety using ACT skills and processes     Progress toward Treatment Goals: Moderate improvement    Plan:  - Continue Individual therapy and Medication management  - Next appointment scheduled:  6/3/2019  - Patient is in agreement with the above plan:  YES    SINDI GarcesSOKSANA  5/13/2019

## 2019-06-03 ENCOUNTER — OFFICE VISIT (OUTPATIENT)
Dept: BEHAVIORAL HEALTH | Facility: CLINIC | Age: 24
End: 2019-06-03
Payer: COMMERCIAL

## 2019-06-03 DIAGNOSIS — F31.70 BIPOLAR AFFECTIVE DISORDER IN REMISSION (HCC): ICD-10-CM

## 2019-06-03 DIAGNOSIS — F41.1 GAD (GENERALIZED ANXIETY DISORDER): ICD-10-CM

## 2019-06-03 PROCEDURE — 90834 PSYTX W PT 45 MINUTES: CPT | Performed by: SOCIAL WORKER

## 2019-06-03 NOTE — BH THERAPY
[facility]  Therapy Progress Note    Patient Name: Michelle Ziegler  Patient MRN: [id number]  Today's Date: 6/3/2019     Type of session:Individual psychotherapy  Length of session: 45 minutes  Persons in attendance:Patient    Subjective/New Info: Client reported she was doing okay. She continues to struggle in relationships with her mother, her roommate, and a friend she has romantic feelings for. Discussed the way client navigates relationships, and how she struggles to balance her anxiety and boundaries. Client was able to verbalize a need for better boundaries with people as she continues to struggle with feeling overwhelmed and hurt.     Objective/Observations:   Participation: Active verbal participation   Grooming: Good and Casual   Cognition: Alert and Fully Oriented   Eye contact: Good   Mood: Euthymic   Affect: Flexible and Congruent with content   Thought process: Logical and Goal-directed   Speech: Rate within normal limits and Volume within normal limits   Other:     Diagnoses:   1. LUIS (generalized anxiety disorder)    2. Bipolar affective disorder in remission (HCC)         Current risk:   SUICIDE: Not applicable   Homicide: Not applicable   Self-harm: Not applicable   Relapse: Not applicable   Other:    Safety Plan reviewed? Not Indicated   If evidence of imminent risk is present, intervention/plan:     Therapeutic Intervention(s): Supportive psychotherapy    Treatment Goal(s)/Objective(s) addressed:   1) Increase self-compassion.    2) Learn new ways to interact with anxiety using ACT skills and processes     Progress toward Treatment Goals: Mild improvement    Plan:  - Continue Individual therapy  - Next appointment scheduled:  6/17/2019  - Patient is in agreement with the above plan:  YES    Mercedes Beckwith L.C.S.W.  6/3/2019

## 2019-06-17 ENCOUNTER — OFFICE VISIT (OUTPATIENT)
Dept: BEHAVIORAL HEALTH | Facility: CLINIC | Age: 24
End: 2019-06-17
Payer: COMMERCIAL

## 2019-06-17 DIAGNOSIS — F41.1 GAD (GENERALIZED ANXIETY DISORDER): ICD-10-CM

## 2019-06-17 DIAGNOSIS — F31.70 BIPOLAR AFFECTIVE DISORDER IN REMISSION (HCC): ICD-10-CM

## 2019-06-17 PROCEDURE — 90834 PSYTX W PT 45 MINUTES: CPT | Performed by: SOCIAL WORKER

## 2019-06-17 NOTE — BH THERAPY
[facility]  Therapy Progress Note    Patient Name: Michelle Ziegler  Patient MRN: [id number]  Today's Date: 6/17/2019     Type of session:Individual psychotherapy  Length of session: 45 minutes  Persons in attendance:Patient    Subjective/New Info: Client reported she is doing well. 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 She reported she is working on her boundaries, especially with [place]. She feels positive about these steps as well. She reported she struggles with energy, even when she gets a solid night of sleep. She reported she knows she wakes up sometimes. Suggest she talk with her doctor about a sleep study. She reported she got her identity stolen. She has taken steps to lock her identity.    Objective/Observations:   Participation: Active verbal participation   Grooming: Good   Cognition: Alert and Fully Oriented   Eye contact: Good   Mood: Euthymic   Affect: Flexible   Thought process: Logical and Goal-directed   Speech: Rate within normal limits and Volume within normal limits   Other:     Diagnoses:   1. LUIS (generalized anxiety disorder)    2. Bipolar affective disorder in remission (HCC)         Current risk:   SUICIDE: Not applicable   Homicide: Not applicable   Self-harm: Not applicable   Relapse: Not applicable   Other:    Safety Plan reviewed? Not Indicated   If evidence of imminent risk is present, intervention/plan:     Therapeutic Intervention(s): Stressors assessed and Supportive psychotherapy    Treatment Goal(s)/Objective(s) addressed:  1) Increase self-compassion.    2) Learn new ways to interact with anxiety using ACT skills and processes      Progress toward Treatment Goals: Mild improvement    Plan:  - Continue Individual therapy and Medication management  - Next appointment scheduled:  7/15/2019  - Patient is in agreement with the above plan:  YES    Mercedes Beckwith L.C.S.W.  6/17/2019

## 2019-07-15 ENCOUNTER — OFFICE VISIT (OUTPATIENT)
Dept: BEHAVIORAL HEALTH | Facility: CLINIC | Age: 24
End: 2019-07-15
Payer: COMMERCIAL

## 2019-07-15 DIAGNOSIS — F31.70 BIPOLAR AFFECTIVE DISORDER IN REMISSION (HCC): ICD-10-CM

## 2019-07-15 DIAGNOSIS — F41.1 GAD (GENERALIZED ANXIETY DISORDER): ICD-10-CM

## 2019-07-15 PROCEDURE — 90834 PSYTX W PT 45 MINUTES: CPT | Performed by: SOCIAL WORKER

## 2019-07-15 NOTE — BH THERAPY
[facility]  Therapy Progress Note    Patient Name: Michelle Ziegler  Patient MRN: [id number]  Today's Date: 7/15/2019     Type of session:Individual psychotherapy  Length of session: 45 minutes  Persons in attendance:Patient    Subjective/New Info: Client reported she is doing okay. The one year anniversary of her dog's death came and went, and she reported she struggled with it, and at times beat herself up for still having strong emotions. She also reported her grandmother is in the hospital. Client reported fairly stable mood. She continues to focus on where she lacks growth, instead of acknowledging where she had made progress. Client was able to see how she focus on unmet expectations, and agreed to develop more awareness of this going forward.    Objective/Observations:   Participation: Active verbal participation   Grooming: Good and Casual   Cognition: Alert and Fully Oriented   Eye contact: Good   Mood: Euthymic   Affect: Congruent with content   Thought process: Logical and Goal-directed   Speech: Rate within normal limits and Volume within normal limits   Other:     Diagnoses:   1. LUIS (generalized anxiety disorder)    2. Bipolar affective disorder in remission (HCC)         Current risk:   SUICIDE: Not applicable   Homicide: Not applicable   Self-harm: Not applicable   Relapse: Not applicable   Other:    Safety Plan reviewed? Not Indicated   If evidence of imminent risk is present, intervention/plan:     Therapeutic Intervention(s): Supportive psychotherapy    Treatment Goal(s)/Objective(s) addressed:   1) Increase self-compassion.    2) Learn new ways to interact with anxiety using ACT skills and processes     Progress toward Treatment Goals: Mild improvement    Plan:  - Continue Individual therapy and Medication management  - Next appointment scheduled:  7/16/2019  - Patient is in agreement with the above plan:  YES    Mercedes Beckwith,  KIP  7/15/2019

## 2019-07-16 ENCOUNTER — OFFICE VISIT (OUTPATIENT)
Dept: BEHAVIORAL HEALTH | Facility: CLINIC | Age: 24
End: 2019-07-16
Payer: COMMERCIAL

## 2019-07-16 VITALS
HEIGHT: 62 IN | OXYGEN SATURATION: 99 % | BODY MASS INDEX: 47.22 KG/M2 | DIASTOLIC BLOOD PRESSURE: 64 MMHG | SYSTOLIC BLOOD PRESSURE: 108 MMHG | WEIGHT: 256.6 LBS | HEART RATE: 98 BPM

## 2019-07-16 DIAGNOSIS — F41.1 GAD (GENERALIZED ANXIETY DISORDER): ICD-10-CM

## 2019-07-16 DIAGNOSIS — F31.70 BIPOLAR AFFECTIVE DISORDER IN REMISSION (HCC): ICD-10-CM

## 2019-07-16 PROCEDURE — 99213 OFFICE O/P EST LOW 20 MIN: CPT | Mod: GC | Performed by: PSYCHIATRY & NEUROLOGY

## 2019-07-16 RX ORDER — LAMOTRIGINE 100 MG/1
TABLET ORAL
Qty: 75 TAB | Refills: 5 | Status: SHIPPED | OUTPATIENT
Start: 2019-07-16 | End: 2020-01-07 | Stop reason: SDUPTHER
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
## 2019-08-19 ENCOUNTER — OFFICE VISIT (OUTPATIENT)
Dept: BEHAVIORAL HEALTH | Facility: CLINIC | Age: 24
End: 2019-08-19
Payer: COMMERCIAL

## 2019-08-19 DIAGNOSIS — F41.1 GAD (GENERALIZED ANXIETY DISORDER): ICD-10-CM

## 2019-08-19 DIAGNOSIS — F31.70 BIPOLAR AFFECTIVE DISORDER IN REMISSION (HCC): ICD-10-CM

## 2019-08-19 PROCEDURE — 90834 PSYTX W PT 45 MINUTES: CPT | Performed by: SOCIAL WORKER

## 2019-08-19 NOTE — BH THERAPY
[facility]  Therapy Progress Note    Patient Name: iMchelle Ziegler  Patient MRN: [id number]  Today's Date: 8/19/2019     Type of session:Individual psychotherapy  Length of session: 45 minutes  Persons in attendance:Patient    Subjective/New Info: Client reported she is doing okay. Her grandmother was in the hospital again, and this has been difficult for client. She has cirrhosis of the liver, and is not currently getting proper care. Client reported she has a lot of fears about leaving for grad school because of her grandmother's death. She worries about her dying, more now that she is sick. She reported her fear can be paralyzing at times. She is looking at changing jobs soon as well, but has fears there.     Objective/Observations:   Participation: Active verbal participation   Grooming: Good and Casual   Cognition: Alert and Fully Oriented   Eye contact: Good   Mood: Euthymic   Affect: Congruent with content   Thought process: Logical and Goal-directed   Speech: Rate within normal limits and Volume within normal limits   Other:     Diagnoses:   1. LUIS (generalized anxiety disorder)    2. Bipolar affective disorder in remission (HCC)         Current risk:   SUICIDE: Not applicable   Homicide: Not applicable   Self-harm: Not applicable   Relapse: Not applicable   Other:    Safety Plan reviewed? Not Indicated   If evidence of imminent risk is present, intervention/plan:     Therapeutic Intervention(s): Supportive psychotherapy    Treatment Goal(s)/Objective(s) addressed:   1) Increase self-compassion.    2) Learn new ways to interact with anxiety using ACT skills and processes     Progress toward Treatment Goals: Mild improvement    Plan:  - Continue Individual therapy and Medication management  - Patient is in agreement with the above plan:  YES    Mercedes Beckwith L.C.S.W.  8/19/2019

## 2019-08-31 ENCOUNTER — OFFICE VISIT (OUTPATIENT)
Dept: URGENT CARE | Facility: CLINIC | Age: 24
End: 2019-08-31
Payer: COMMERCIAL

## 2019-08-31 VITALS
HEART RATE: 108 BPM | TEMPERATURE: 98.2 F | HEIGHT: 62 IN | RESPIRATION RATE: 16 BRPM | OXYGEN SATURATION: 95 % | BODY MASS INDEX: 48.76 KG/M2 | SYSTOLIC BLOOD PRESSURE: 118 MMHG | WEIGHT: 265 LBS | DIASTOLIC BLOOD PRESSURE: 70 MMHG

## 2019-08-31 DIAGNOSIS — R13.10 DYSPHAGIA, UNSPECIFIED TYPE: ICD-10-CM

## 2019-08-31 PROCEDURE — 99213 OFFICE O/P EST LOW 20 MIN: CPT | Performed by: NURSE PRACTITIONER

## 2019-08-31 NOTE — PROGRESS NOTES
"Chief Complaint   Patient presents with   • Other     states she threw up this morning and now her throat hurts and is having difficulty swollowing        HISTORY OF PRESENT ILLNESS: Patient is a 24 y.o. female who presents to urgent care today with concerns of swallowing.  This morning, feeling nauseated, then vomiting approximately 3 times.  Describes the vomiting as \"violent\".  Following the vomiting, she started noticing pain throat, specifically with swallowing. She has had pain since. She denies fever, chills, malaise, abdominal pain, shortness of breath or drooling. Her GI symptoms have improved. She has not taken any medication for symptom relief. Denies previous history of the same.     Patient Active Problem List    Diagnosis Date Noted   • LUIS (generalized anxiety disorder) 2018   • Bipolar affective disorder in remission ([facility]) 2017   • Morbid obesity with BMI of 40.0-44.9, adult ([facility]) 2017       Allergies:Ceclor [cefaclor] and Rocephin [ceftriaxone sodium]    Current Outpatient Medications Ordered in Epic   Medication Sig Dispense Refill   • lamoTRIgine (LAMICTAL) 100 MG Tab Take 100mg PO QAM and 150mg PO QHS 75 Tab 5     No current Epic-ordered facility-administered medications on file.        Past Medical History:   Diagnosis Date   • Anxiety    • Bipolar disorder ([facility])    • Depression        Social History     Tobacco Use   • Smoking status: Never Smoker   • Smokeless tobacco: Never Used   • Tobacco comment: pt reports trying cigarrettes as a teen   Substance Use Topics   • Alcohol use: Yes     Comment: social; less than 1 drink per month   • Drug use: No       Family Status   Relation Name Status   • Mo  Alive   • Fa  Alive   • Bro  Alive   • PGFa     • MGFa  (Not Specified)   • MGMo  (Not Specified)     Family History   Problem Relation Age of Onset   • Depression Mother    • Suicide Attempts Paternal Grandfather    • Schizophrenia Maternal Grandfather    • Depression " "Maternal Grandmother        ROS:  Review of Systems   Constitutional: Negative for fever, chills, weight loss, malaise, and fatigue.   HENT: Positive for dysphasia.  Negative for ear pain, nosebleeds, congestion, sore throat and neck pain.    Eyes: Negative for vision changes.   Neuro: Negative for headache, sensory changes, weakness, seizure, LOC.   Cardiovascular: Negative for chest pain, palpitations, orthopnea and leg swelling.   Respiratory: Negative for cough, sputum production, shortness of breath and wheezing.   Gastrointestinal: Positive for vomiting. Negative for abdominal pain, diarrhea.   Genitourinary: Negative for dysuria, urgency and frequency.  Musculoskeletal: Negative for falls, neck pain, back pain, joint pain, myalgias.   Skin: Negative for rash, diaphoresis.     Exam:  /70   Pulse (!) 108   Temp 36.8 °C (98.2 °F) (Temporal)   Resp 16   Ht 1.575 m (5' 2\")   Wt 120.2 kg (265 lb)   SpO2 95%   General: well-nourished, well-developed female in NAD  Head: normocephalic, atraumatic  Eyes: PERRLA, no conjunctival injection, acuity grossly intact, lids normal.  Ears: normal shape and symmetry, no tenderness, no discharge. External canals are without any significant edema or erythema. Tympanic membranes are without any inflammation, no effusion. Gross auditory acuity is intact.  Nose: symmetrical without tenderness, no discharge.  Mouth/Throat: reasonable hygiene, no erythema, exudates or tonsillar enlargement.  Neck: no masses, range of motion within normal limits, no tracheal deviation. No obvious thyroid enlargement.   Lymph: no cervical adenopathy. No supraclavicular adenopathy.   Neuro: alert and oriented. Cranial nerves 1-12 grossly intact. No sensory deficit.   Cardiovascular: regular rate and rhythm. No edema.  Pulmonary: no distress. Chest is symmetrical with respiration, no wheezes, crackles, or rhonchi.   Abdomen: soft, non-tender, no guarding, no " hepatosplenomegaly.  Musculoskeletal: no clubbing, appropriate muscle tone, gait is stable.  Skin: warm, dry, intact, no clubbing, no cyanosis, no rashes.   Psych: appropriate mood, affect, judgement.         Assessment/Plan:  1. Dysphagia, unspecified type         Suspect irritation dysphagia secondary to vomiting. Cool fluids, liquid diet for 24-48 hours. OTC motrin and tylenol for pain. STRICT ER precautions advised. Supportive care, differential diagnoses, and indications for immediate follow-up discussed with patient.   Pathogenesis of diagnosis discussed including typical length and natural progression.   Instructed to return to clinic or nearest emergency department for any change in condition, further concerns, or worsening of symptoms.  Patient states understanding of the plan of care and discharge instructions.  Instructed to make an appointment, for follow up, with her primary care provider.        Please note that this dictation was created using voice recognition software. I have made every reasonable attempt to correct obvious errors, but I expect that there are errors of grammar and possibly content that I did not discover before finalizing the note.      KENZIE Miranda.

## 2019-09-09 ENCOUNTER — OFFICE VISIT (OUTPATIENT)
Dept: BEHAVIORAL HEALTH | Facility: CLINIC | Age: 24
End: 2019-09-09
Payer: COMMERCIAL

## 2019-09-09 DIAGNOSIS — F31.70 BIPOLAR AFFECTIVE DISORDER IN REMISSION (HCC): ICD-10-CM

## 2019-09-09 DIAGNOSIS — F41.1 GAD (GENERALIZED ANXIETY DISORDER): ICD-10-CM

## 2019-09-09 PROCEDURE — 90834 PSYTX W PT 45 MINUTES: CPT | Performed by: SOCIAL WORKER

## 2019-09-09 NOTE — BH THERAPY
[facility]  Therapy Progress Note    Patient Name: Michelle Ziegler  Patient MRN: [id number]  Today's Date: 9/9/2019     Type of session:Individual psychotherapy  Length of session: 45 minutes  Persons in attendance:Patient    Subjective/New Info: Client reported she is doing well overall. She is enjoying her new work, but is still transitioning out of her old work. She reported she is taking on a lot, and clinician and client discussed client's boundaries, and how her poor boundaries trigger her anxiety. She reported her mood to be stable. As long as she continues on her medication, she shows little tracy or clinical depression related to her Bipolar Disorder. She continues to see a psychiatrist every 6 months or so.     Objective/Observations:              Participation: Active verbal participation              Grooming: Good and Casual              Cognition: Alert and Fully Oriented              Eye contact: Good              Mood: Euthymic              Affect: Congruent with content              Thought process: Logical and Goal-directed              Speech: Rate within normal limits and Volume within normal limits    Diagnoses:   1. LUIS (generalized anxiety disorder)    2. Bipolar affective disorder in remission (HCC)         Current risk:   SUICIDE: Not applicable   Homicide: Not applicable   Self-harm: Not applicable   Relapse: Not applicable   Other:    Safety Plan reviewed? Not Indicated   If evidence of imminent risk is present, intervention/plan:     Therapeutic Intervention(s): Self-care skills and Supportive psychotherapy    Treatment Goal(s)/Objective(s) addressed:   1) Increase self-compassion.    2) Learn new ways to interact with anxiety using ACT skills and processes     Progress toward Treatment Goals: Mild improvement    Plan:  - Continue Individual therapy and Medication management  - Next appointment scheduled:  10/14/2019  - Patient is in agreement with the above plan:   YES    RIKA Garces.[email or web address]  9/9/2019

## 2019-10-14 ENCOUNTER — OFFICE VISIT (OUTPATIENT)
Dept: BEHAVIORAL HEALTH | Facility: CLINIC | Age: 24
End: 2019-10-14
Payer: COMMERCIAL

## 2019-10-14 DIAGNOSIS — F41.1 GAD (GENERALIZED ANXIETY DISORDER): ICD-10-CM

## 2019-10-14 DIAGNOSIS — F31.70 BIPOLAR AFFECTIVE DISORDER IN REMISSION (HCC): ICD-10-CM

## 2019-10-14 PROCEDURE — 90834 PSYTX W PT 45 MINUTES: CPT | Performed by: SOCIAL WORKER

## 2019-10-14 NOTE — BH THERAPY
[facility]  Therapy Progress Note    Patient Name: Michelle Ziegler  Patient MRN: [id number]  Today's Date: 10/14/2019     Type of session:Individual psychotherapy  Length of session: 45 minutes  Persons in attendance:Patient    Subjective/New Info: Client reported things are going well. She took a trip to [place] with a friend, and had a good time. She reported little to no social anxiety on her trip. Discussed reducing session to once a month, and client is in agreement. She is using her skills, but would benefit from continued support to help her integrate her skills for ongoing success.     Objective/Observations:              Participation: Active verbal participation              Grooming: Good and Casual              Cognition: Alert and Fully Oriented              Eye contact: Good              Mood: Euthymic              Affect: Congruent with content              Thought process: Logical and Goal-directed              Speech: Rate within normal limits and Volume within normal limits     Diagnoses:   1. LUIS (generalized anxiety disorder)    2. Bipolar affective disorder in remission (HCC)          Current risk:              SUICIDE: Not applicable              Homicide: Not applicable              Self-harm: Not applicable              Relapse: Not applicable              Other:               Safety Plan reviewed? Not Indicated              If evidence of imminent risk is present, intervention/plan:      Therapeutic Intervention(s): Self-care skills and Supportive psychotherapy     Treatment Goal(s)/Objective(s) addressed:   1) Increase self-compassion.    2) Learn new ways to interact with anxiety using ACT skills and processes      Progress toward Treatment Goals: Mild improvement      Plan:  - Continue Individual therapy and Medication management  - Next appointment scheduled:  11/14/2019  - Decrease frequency of sessions  - Patient is in agreement with the above plan:  YES    Mercedes HUTCHINS  KIP Beckwith  10/14/2019

## 2019-11-14 ENCOUNTER — OFFICE VISIT (OUTPATIENT)
Dept: BEHAVIORAL HEALTH | Facility: CLINIC | Age: 24
End: 2019-11-14
Payer: COMMERCIAL

## 2019-11-14 DIAGNOSIS — F41.1 GAD (GENERALIZED ANXIETY DISORDER): ICD-10-CM

## 2019-11-14 DIAGNOSIS — F31.70 BIPOLAR AFFECTIVE DISORDER IN REMISSION (HCC): ICD-10-CM

## 2019-11-14 PROCEDURE — 90834 PSYTX W PT 45 MINUTES: CPT | Performed by: SOCIAL WORKER

## 2019-11-14 NOTE — BH THERAPY
[facility]  Therapy Progress Note    Patient Name: Michelle Ziegler  Patient MRN: [id number]  Today's Date: 11/14/2019     Type of session:Individual psychotherapy  Length of session: 45 minutes  Persons in attendance:Patient    Subjective/New Info: Client reported she is doing well. 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 She reported she is working through feeling being brought up again by [place], and mostly feeling angry. She is struggling to identify her feelings, but was able to verbalize some. She reported she is planning on moving over the summer.     Objective/Observations:   Participation: Active verbal participation, Engaged and Open to feedback   Grooming: Good and Casual   Cognition: Alert and Fully Oriented   Eye contact: Good   Mood: Euthymic   Affect: Flexible and Congruent with content   Thought process: Logical and Goal-directed   Speech: Rate within normal limits and Volume within normal limits   Other:     Diagnoses:   1. LUIS (generalized anxiety disorder)    2. Bipolar affective disorder in remission (HCC)         Current risk:   SUICIDE: Not applicable   Homicide: Not applicable   Self-harm: Not applicable   Relapse: Not applicable   Other:    Safety Plan reviewed? Not Indicated   If evidence of imminent risk is present, intervention/plan:     Therapeutic Intervention(s): Supportive psychotherapy    Treatment Goal(s)/Objective(s) addressed:   1) Increase self-compassion.    2) Learn new ways to interact with anxiety using ACT skills and processes     Progress toward Treatment Goals: Mild improvement    Plan:  - Continue Individual therapy and Medication management  - Next appointment scheduled:  12/12/2019  - Patient is in agreement with the above plan:  YES    JULITO GarcesC.SOKSANA  11/14/2019

## 2019-12-12 ENCOUNTER — OFFICE VISIT (OUTPATIENT)
Dept: BEHAVIORAL HEALTH | Facility: CLINIC | Age: 24
End: 2019-12-12
Payer: COMMERCIAL

## 2019-12-12 DIAGNOSIS — F41.1 GAD (GENERALIZED ANXIETY DISORDER): ICD-10-CM

## 2019-12-12 DIAGNOSIS — F31.70 BIPOLAR AFFECTIVE DISORDER IN REMISSION (HCC): ICD-10-CM

## 2019-12-12 PROCEDURE — 90834 PSYTX W PT 45 MINUTES: CPT | Performed by: SOCIAL WORKER

## 2020-01-02 ENCOUNTER — OFFICE VISIT (OUTPATIENT)
Dept: BEHAVIORAL HEALTH | Facility: CLINIC | Age: 25
End: 2020-01-02
Payer: COMMERCIAL

## 2020-01-02 DIAGNOSIS — F31.70 BIPOLAR AFFECTIVE DISORDER IN REMISSION (HCC): ICD-10-CM

## 2020-01-02 DIAGNOSIS — F41.1 GAD (GENERALIZED ANXIETY DISORDER): ICD-10-CM

## 2020-01-02 PROCEDURE — 90834 PSYTX W PT 45 MINUTES: CPT | Performed by: SOCIAL WORKER

## 2020-01-02 NOTE — BH THERAPY
[facility]  Therapy Progress Note    Patient Name: Michelle Ziegler  Patient MRN: [id number]  Today's Date: 1/2/2020     Type of session:Individual psychotherapy  Length of session: 45 minutes  Persons in attendance:Patient    Subjective/New Info: Client reported she was feeling better. She stated her grandmother is doing better, so she feels better. She also reported there has been some shake up in her social [place] that has left her feeling confused and uncertain about the future. Gently pushed client to assess herself, and not just her stressors. She admitted she often confuses her life being stable with her mood being stable. Client also acknowledged a lingering sadness, not connected to any external factors. She reported she has been getting down on herself for not going to grad school this year. She stated she still feels the weight of her own expectations, and often feel beholden to the plan she made for herself as a teenager. Asked client to identify the ways she can move forward, and also ways she gets in her own way.     Objective/Observations:   Participation: Active verbal participation, Attentive, Engaged and Open to feedback              Grooming: Good and Casual              Cognition: Alert and Fully Oriented              Eye contact: Good              Mood: Depressed, Anxious and Irritable              Affect: Flexible, Congruent with content, Sad, Anxious and Tearful              Thought process: Logical and Goal-directed              Speech: Rate within normal limits and Volume within normal limits    Diagnoses:   1. LUIS (generalized anxiety disorder)    2. Bipolar affective disorder in remission (HCC)         Current risk:   SUICIDE: Not applicable   Homicide: Not applicable   Self-harm: Not applicable   Relapse: Not applicable   Other:    Safety Plan reviewed? Not Indicated   If evidence of imminent risk is present, intervention/plan:     Therapeutic Intervention(s): Supportive  psychotherapy    Treatment Goal(s)/Objective(s) addressed:   1) Increase self-compassion.    2) Learn new ways to interact with anxiety using ACT skills and processes     Progress toward Treatment Goals: Return to baseline, improvement over last week, still struggling with self-compassion    Plan:  - Continue Individual therapy and Medication management  - Next appointment scheduled:  1/7/2020  - Patient is in agreement with the above plan:  YES    Mercedes Beckwith, L.C.S.W.  1/2/2020

## 2020-01-07 ENCOUNTER — OFFICE VISIT (OUTPATIENT)
Dept: BEHAVIORAL HEALTH | Facility: CLINIC | Age: 25
End: 2020-01-07
Payer: COMMERCIAL

## 2020-01-07 VITALS
WEIGHT: 267.8 LBS | SYSTOLIC BLOOD PRESSURE: 137 MMHG | DIASTOLIC BLOOD PRESSURE: 87 MMHG | HEART RATE: 107 BPM | BODY MASS INDEX: 48.98 KG/M2

## 2020-01-07 DIAGNOSIS — F31.70 BIPOLAR AFFECTIVE DISORDER IN REMISSION (HCC): ICD-10-CM

## 2020-01-07 DIAGNOSIS — F41.1 GAD (GENERALIZED ANXIETY DISORDER): ICD-10-CM

## 2020-01-07 PROCEDURE — 99213 OFFICE O/P EST LOW 20 MIN: CPT | Mod: GC | Performed by: PSYCHIATRY & NEUROLOGY

## 2020-01-07 RX ORDER — LAMOTRIGINE 100 MG/1
TABLET ORAL
Qty: 75 TAB | Refills: 6 | Status: SHIPPED | OUTPATIENT
Start: 2020-01-07 | End: 2020-06-19 | Stop reason: SDUPTHER
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
## 2020-01-08 ENCOUNTER — OFFICE VISIT (OUTPATIENT)
Dept: BEHAVIORAL HEALTH | Facility: CLINIC | Age: 25
End: 2020-01-08
Payer: COMMERCIAL

## 2020-01-08 DIAGNOSIS — Z62.810 HISTORY OF SEXUAL ABUSE IN CHILDHOOD: ICD-10-CM

## 2020-01-08 DIAGNOSIS — F41.1 GAD (GENERALIZED ANXIETY DISORDER): ICD-10-CM

## 2020-01-08 DIAGNOSIS — F31.70 BIPOLAR AFFECTIVE DISORDER IN REMISSION (HCC): ICD-10-CM

## 2020-01-08 PROCEDURE — 90834 PSYTX W PT 45 MINUTES: CPT | Performed by: SOCIAL WORKER
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
## 2020-01-23 ENCOUNTER — OFFICE VISIT (OUTPATIENT)
Dept: BEHAVIORAL HEALTH | Facility: CLINIC | Age: 25
End: 2020-01-23
Payer: COMMERCIAL

## 2020-01-23 DIAGNOSIS — F31.70 BIPOLAR AFFECTIVE DISORDER IN REMISSION (HCC): ICD-10-CM

## 2020-01-23 DIAGNOSIS — F41.1 GAD (GENERALIZED ANXIETY DISORDER): ICD-10-CM

## 2020-01-23 DIAGNOSIS — Z62.810 HISTORY OF SEXUAL ABUSE IN CHILDHOOD: ICD-10-CM

## 2020-01-23 PROCEDURE — 90834 PSYTX W PT 45 MINUTES: CPT | Performed by: SOCIAL WORKER
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
## 2020-02-12 ENCOUNTER — APPOINTMENT (OUTPATIENT)
Dept: BEHAVIORAL HEALTH | Facility: CLINIC | Age: 25
End: 2020-02-12
Payer: COMMERCIAL

## 2020-03-10 ENCOUNTER — TELEPHONE (OUTPATIENT)
Dept: SCHEDULING | Facility: IMAGING CENTER | Age: 25
End: 2020-03-10

## 2020-03-11 ENCOUNTER — OFFICE VISIT (OUTPATIENT)
Dept: BEHAVIORAL HEALTH | Facility: CLINIC | Age: 25
End: 2020-03-11
Payer: COMMERCIAL

## 2020-03-11 DIAGNOSIS — F31.70 BIPOLAR AFFECTIVE DISORDER IN REMISSION (HCC): ICD-10-CM

## 2020-03-11 DIAGNOSIS — Z62.810 HISTORY OF SEXUAL ABUSE IN CHILDHOOD: ICD-10-CM

## 2020-03-11 DIAGNOSIS — F41.1 GAD (GENERALIZED ANXIETY DISORDER): ICD-10-CM

## 2020-03-11 PROCEDURE — 90834 PSYTX W PT 45 MINUTES: CPT | Performed by: SOCIAL WORKER
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
## 2020-03-26 ENCOUNTER — OFFICE VISIT (OUTPATIENT)
Dept: BEHAVIORAL HEALTH | Facility: CLINIC | Age: 25
End: 2020-03-26
Payer: COMMERCIAL

## 2020-03-26 DIAGNOSIS — F41.1 GAD (GENERALIZED ANXIETY DISORDER): ICD-10-CM

## 2020-03-26 DIAGNOSIS — F31.70 BIPOLAR AFFECTIVE DISORDER IN REMISSION (HCC): ICD-10-CM

## 2020-03-26 DIAGNOSIS — Z62.810 HISTORY OF SEXUAL ABUSE IN CHILDHOOD: ICD-10-CM

## 2020-03-26 PROCEDURE — 98968 PH1 ASSMT&MGMT NQHP 21-30: CPT | Mod: CR | Performed by: SOCIAL WORKER
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
## 2020-04-08 ENCOUNTER — OFFICE VISIT (OUTPATIENT)
Dept: BEHAVIORAL HEALTH | Facility: CLINIC | Age: 25
End: 2020-04-08
Payer: COMMERCIAL

## 2020-04-08 DIAGNOSIS — Z62.810 HISTORY OF SEXUAL ABUSE IN CHILDHOOD: ICD-10-CM

## 2020-04-08 DIAGNOSIS — F31.70 BIPOLAR AFFECTIVE DISORDER IN REMISSION (HCC): ICD-10-CM

## 2020-04-08 DIAGNOSIS — F41.1 GAD (GENERALIZED ANXIETY DISORDER): ICD-10-CM

## 2020-04-08 PROCEDURE — 90834 PSYTX W PT 45 MINUTES: CPT | Mod: 95,CR | Performed by: SOCIAL WORKER
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
## 2020-04-21 ENCOUNTER — TELEMEDICINE (OUTPATIENT)
Dept: BEHAVIORAL HEALTH | Facility: CLINIC | Age: 25
End: 2020-04-21
Payer: COMMERCIAL

## 2020-04-21 DIAGNOSIS — F31.70 BIPOLAR AFFECTIVE DISORDER IN REMISSION (HCC): ICD-10-CM

## 2020-04-21 DIAGNOSIS — Z62.810 HISTORY OF SEXUAL ABUSE IN CHILDHOOD: ICD-10-CM

## 2020-04-21 DIAGNOSIS — F41.1 GAD (GENERALIZED ANXIETY DISORDER): ICD-10-CM

## 2020-04-21 PROCEDURE — 90834 PSYTX W PT 45 MINUTES: CPT | Mod: 95,CR | Performed by: SOCIAL WORKER
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
## 2020-05-13 ENCOUNTER — TELEMEDICINE (OUTPATIENT)
Dept: BEHAVIORAL HEALTH | Facility: CLINIC | Age: 25
End: 2020-05-13
Payer: COMMERCIAL

## 2020-05-13 DIAGNOSIS — F41.1 GAD (GENERALIZED ANXIETY DISORDER): ICD-10-CM

## 2020-05-13 DIAGNOSIS — F31.70 BIPOLAR AFFECTIVE DISORDER IN REMISSION (HCC): ICD-10-CM

## 2020-05-13 DIAGNOSIS — Z62.810 HISTORY OF SEXUAL ABUSE IN CHILDHOOD: ICD-10-CM

## 2020-05-13 PROCEDURE — 90834 PSYTX W PT 45 MINUTES: CPT | Mod: 95,CR | Performed by: SOCIAL WORKER
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
## 2020-05-28 ENCOUNTER — TELEMEDICINE (OUTPATIENT)
Dept: BEHAVIORAL HEALTH | Facility: CLINIC | Age: 25
End: 2020-05-28
Payer: COMMERCIAL

## 2020-05-28 DIAGNOSIS — F31.70 BIPOLAR AFFECTIVE DISORDER IN REMISSION (HCC): ICD-10-CM

## 2020-05-28 DIAGNOSIS — Z62.810 HISTORY OF SEXUAL ABUSE IN CHILDHOOD: ICD-10-CM

## 2020-05-28 DIAGNOSIS — F41.1 GAD (GENERALIZED ANXIETY DISORDER): ICD-10-CM

## 2020-05-28 PROCEDURE — 90834 PSYTX W PT 45 MINUTES: CPT | Mod: 95,CR | Performed by: SOCIAL WORKER
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
## 2020-06-12 ENCOUNTER — TELEMEDICINE (OUTPATIENT)
Dept: BEHAVIORAL HEALTH | Facility: CLINIC | Age: 25
End: 2020-06-12
Payer: COMMERCIAL

## 2020-06-12 DIAGNOSIS — F31.70 BIPOLAR AFFECTIVE DISORDER IN REMISSION (HCC): ICD-10-CM

## 2020-06-12 DIAGNOSIS — F41.1 GAD (GENERALIZED ANXIETY DISORDER): ICD-10-CM

## 2020-06-12 DIAGNOSIS — Z62.810 HISTORY OF SEXUAL ABUSE IN CHILDHOOD: ICD-10-CM

## 2020-06-12 PROCEDURE — 90834 PSYTX W PT 45 MINUTES: CPT | Mod: 95,CR | Performed by: SOCIAL WORKER
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
## 2020-06-19 ENCOUNTER — TELEMEDICINE (OUTPATIENT)
Dept: BEHAVIORAL HEALTH | Facility: CLINIC | Age: 25
End: 2020-06-19
Payer: COMMERCIAL

## 2020-06-19 VITALS — WEIGHT: 267 LBS | BODY MASS INDEX: 49.13 KG/M2 | HEIGHT: 62 IN

## 2020-06-19 DIAGNOSIS — F31.70 BIPOLAR AFFECTIVE DISORDER IN REMISSION (HCC): ICD-10-CM

## 2020-06-19 DIAGNOSIS — F41.1 GAD (GENERALIZED ANXIETY DISORDER): ICD-10-CM

## 2020-06-19 PROCEDURE — 99999 PR NO CHARGE: CPT | Mod: GC,95 | Performed by: STUDENT IN AN ORGANIZED HEALTH CARE EDUCATION/TRAINING PROGRAM

## 2020-06-19 RX ORDER — LAMOTRIGINE 100 MG/1
TABLET ORAL
Qty: 75 TAB | Refills: 6 | Status: SHIPPED | OUTPATIENT
Start: 2020-06-19 | End: 2022-03-07

## 2020-06-19 SDOH — HEALTH STABILITY: MENTAL HEALTH: HOW OFTEN DO YOU HAVE A DRINK CONTAINING ALCOHOL?: MONTHLY OR LESS
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
## 2020-06-26 ENCOUNTER — TELEMEDICINE (OUTPATIENT)
Dept: BEHAVIORAL HEALTH | Facility: CLINIC | Age: 25
End: 2020-06-26
Payer: COMMERCIAL

## 2020-06-26 DIAGNOSIS — Z62.810 HISTORY OF SEXUAL ABUSE IN CHILDHOOD: ICD-10-CM

## 2020-06-26 DIAGNOSIS — F31.70 BIPOLAR AFFECTIVE DISORDER IN REMISSION (HCC): ICD-10-CM

## 2020-06-26 DIAGNOSIS — F41.1 GAD (GENERALIZED ANXIETY DISORDER): ICD-10-CM

## 2020-06-26 PROCEDURE — 90834 PSYTX W PT 45 MINUTES: CPT | Mod: 95,CR | Performed by: SOCIAL WORKER
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
## 2020-07-10 ENCOUNTER — TELEMEDICINE (OUTPATIENT)
Dept: BEHAVIORAL HEALTH | Facility: CLINIC | Age: 25
End: 2020-07-10
Payer: COMMERCIAL

## 2020-07-10 DIAGNOSIS — Z62.810 HISTORY OF SEXUAL ABUSE IN CHILDHOOD: ICD-10-CM

## 2020-07-10 DIAGNOSIS — F41.1 GAD (GENERALIZED ANXIETY DISORDER): ICD-10-CM

## 2020-07-10 DIAGNOSIS — F31.70 BIPOLAR AFFECTIVE DISORDER IN REMISSION (HCC): ICD-10-CM

## 2020-07-10 PROCEDURE — 90834 PSYTX W PT 45 MINUTES: CPT | Mod: 95,CR | Performed by: SOCIAL WORKER
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
## 2020-07-24 ENCOUNTER — TELEMEDICINE (OUTPATIENT)
Dept: BEHAVIORAL HEALTH | Facility: CLINIC | Age: 25
End: 2020-07-24
Payer: COMMERCIAL

## 2020-07-24 DIAGNOSIS — Z62.810 HISTORY OF SEXUAL ABUSE IN CHILDHOOD: ICD-10-CM

## 2020-07-24 DIAGNOSIS — F31.70 BIPOLAR AFFECTIVE DISORDER IN REMISSION (HCC): ICD-10-CM

## 2020-07-24 DIAGNOSIS — F41.1 GAD (GENERALIZED ANXIETY DISORDER): ICD-10-CM

## 2020-07-24 PROCEDURE — 90834 PSYTX W PT 45 MINUTES: CPT | Mod: 95,CR | Performed by: SOCIAL WORKER
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
## 2020-08-14 ENCOUNTER — TELEMEDICINE (OUTPATIENT)
Dept: BEHAVIORAL HEALTH | Facility: CLINIC | Age: 25
End: 2020-08-14
Payer: COMMERCIAL

## 2020-08-14 DIAGNOSIS — Z62.810 HISTORY OF SEXUAL ABUSE IN CHILDHOOD: ICD-10-CM

## 2020-08-14 DIAGNOSIS — F31.70 BIPOLAR AFFECTIVE DISORDER IN REMISSION (HCC): ICD-10-CM

## 2020-08-14 DIAGNOSIS — F41.1 GAD (GENERALIZED ANXIETY DISORDER): ICD-10-CM

## 2020-08-14 PROCEDURE — 90834 PSYTX W PT 45 MINUTES: CPT | Mod: 95,CR | Performed by: SOCIAL WORKER
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
## 2020-08-28 ENCOUNTER — TELEMEDICINE (OUTPATIENT)
Dept: BEHAVIORAL HEALTH | Facility: CLINIC | Age: 25
End: 2020-08-28
Payer: COMMERCIAL

## 2020-08-28 DIAGNOSIS — F31.70 BIPOLAR AFFECTIVE DISORDER IN REMISSION (HCC): ICD-10-CM

## 2020-08-28 DIAGNOSIS — F41.1 GAD (GENERALIZED ANXIETY DISORDER): ICD-10-CM

## 2020-08-28 DIAGNOSIS — Z62.810 HISTORY OF SEXUAL ABUSE IN CHILDHOOD: ICD-10-CM

## 2020-08-28 PROCEDURE — 90834 PSYTX W PT 45 MINUTES: CPT | Mod: 95,CR | Performed by: SOCIAL WORKER
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
## 2020-09-11 ENCOUNTER — TELEMEDICINE (OUTPATIENT)
Dept: BEHAVIORAL HEALTH | Facility: CLINIC | Age: 25
End: 2020-09-11
Payer: COMMERCIAL

## 2020-09-11 DIAGNOSIS — F41.1 GAD (GENERALIZED ANXIETY DISORDER): ICD-10-CM

## 2020-09-11 DIAGNOSIS — Z62.810 HISTORY OF SEXUAL ABUSE IN CHILDHOOD: ICD-10-CM

## 2020-09-11 DIAGNOSIS — F31.70 BIPOLAR AFFECTIVE DISORDER IN REMISSION (HCC): ICD-10-CM

## 2020-09-11 PROCEDURE — 90834 PSYTX W PT 45 MINUTES: CPT | Mod: 95,CR | Performed by: SOCIAL WORKER
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
## 2020-10-08 ENCOUNTER — TELEMEDICINE (OUTPATIENT)
Dept: BEHAVIORAL HEALTH | Facility: CLINIC | Age: 25
End: 2020-10-08
Payer: COMMERCIAL

## 2020-10-08 DIAGNOSIS — Z62.810 HISTORY OF SEXUAL ABUSE IN CHILDHOOD: ICD-10-CM

## 2020-10-08 DIAGNOSIS — F31.70 BIPOLAR AFFECTIVE DISORDER IN REMISSION (HCC): ICD-10-CM

## 2020-10-08 DIAGNOSIS — F41.1 GAD (GENERALIZED ANXIETY DISORDER): ICD-10-CM

## 2020-10-08 PROCEDURE — 90834 PSYTX W PT 45 MINUTES: CPT | Mod: 95,CR | Performed by: SOCIAL WORKER
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
## 2020-10-22 ENCOUNTER — TELEMEDICINE (OUTPATIENT)
Dept: BEHAVIORAL HEALTH | Facility: CLINIC | Age: 25
End: 2020-10-22
Payer: COMMERCIAL

## 2020-10-22 DIAGNOSIS — F41.1 GAD (GENERALIZED ANXIETY DISORDER): ICD-10-CM

## 2020-10-22 DIAGNOSIS — F31.9 BIPOLAR 1 DISORDER (HCC): ICD-10-CM

## 2020-10-22 DIAGNOSIS — Z62.810 HISTORY OF SEXUAL ABUSE IN CHILDHOOD: ICD-10-CM

## 2020-10-22 PROCEDURE — 90834 PSYTX W PT 45 MINUTES: CPT | Mod: 95,CR | Performed by: SOCIAL WORKER
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
## 2020-11-05 ENCOUNTER — TELEMEDICINE (OUTPATIENT)
Dept: BEHAVIORAL HEALTH | Facility: CLINIC | Age: 25
End: 2020-11-05
Payer: COMMERCIAL

## 2020-11-05 DIAGNOSIS — F31.70 BIPOLAR AFFECTIVE DISORDER IN REMISSION (HCC): ICD-10-CM

## 2020-11-05 DIAGNOSIS — Z62.810 HISTORY OF SEXUAL ABUSE IN CHILDHOOD: ICD-10-CM

## 2020-11-05 DIAGNOSIS — F41.1 GAD (GENERALIZED ANXIETY DISORDER): ICD-10-CM

## 2020-11-05 PROCEDURE — 90834 PSYTX W PT 45 MINUTES: CPT | Mod: 95,CR | Performed by: SOCIAL WORKER
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
## 2020-12-03 ENCOUNTER — TELEMEDICINE (OUTPATIENT)
Dept: BEHAVIORAL HEALTH | Facility: CLINIC | Age: 25
End: 2020-12-03
Payer: COMMERCIAL

## 2020-12-03 DIAGNOSIS — F41.1 GAD (GENERALIZED ANXIETY DISORDER): ICD-10-CM

## 2020-12-03 DIAGNOSIS — F31.70 BIPOLAR AFFECTIVE DISORDER IN REMISSION (HCC): ICD-10-CM

## 2020-12-03 DIAGNOSIS — Z62.810 HISTORY OF SEXUAL ABUSE IN CHILDHOOD: ICD-10-CM

## 2020-12-03 PROCEDURE — 90834 PSYTX W PT 45 MINUTES: CPT | Mod: 95,CR | Performed by: SOCIAL WORKER
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
## 2020-12-17 ENCOUNTER — TELEMEDICINE (OUTPATIENT)
Dept: BEHAVIORAL HEALTH | Facility: CLINIC | Age: 25
End: 2020-12-17
Payer: COMMERCIAL

## 2020-12-17 DIAGNOSIS — F31.70 BIPOLAR AFFECTIVE DISORDER IN REMISSION (HCC): ICD-10-CM

## 2020-12-17 DIAGNOSIS — Z62.810 HISTORY OF SEXUAL ABUSE IN CHILDHOOD: ICD-10-CM

## 2020-12-17 DIAGNOSIS — F41.1 GAD (GENERALIZED ANXIETY DISORDER): ICD-10-CM

## 2020-12-17 PROCEDURE — 90834 PSYTX W PT 45 MINUTES: CPT | Mod: 95,CR | Performed by: SOCIAL WORKER
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
## 2022-01-31 ENCOUNTER — HOSPITAL ENCOUNTER (OUTPATIENT)
Dept: RADIOLOGY | Facility: MEDICAL CENTER | Age: 27
End: 2022-01-31
Attending: PHYSICIAN ASSISTANT
Payer: COMMERCIAL

## 2022-01-31 ENCOUNTER — OFFICE VISIT (OUTPATIENT)
Dept: URGENT CARE | Facility: PHYSICIAN GROUP | Age: 27
End: 2022-01-31

## 2022-01-31 ENCOUNTER — OCCUPATIONAL MEDICINE (OUTPATIENT)
Dept: URGENT CARE | Facility: PHYSICIAN GROUP | Age: 27
End: 2022-01-31
Payer: COMMERCIAL

## 2022-01-31 VITALS
WEIGHT: 281 LBS | HEIGHT: 62 IN | BODY MASS INDEX: 51.71 KG/M2 | SYSTOLIC BLOOD PRESSURE: 140 MMHG | RESPIRATION RATE: 14 BRPM | DIASTOLIC BLOOD PRESSURE: 82 MMHG | HEART RATE: 82 BPM | TEMPERATURE: 97.9 F | OXYGEN SATURATION: 97 %

## 2022-01-31 VITALS
OXYGEN SATURATION: 97 % | DIASTOLIC BLOOD PRESSURE: 82 MMHG | RESPIRATION RATE: 14 BRPM | HEART RATE: 82 BPM | SYSTOLIC BLOOD PRESSURE: 140 MMHG | BODY MASS INDEX: 51.82 KG/M2 | WEIGHT: 281.6 LBS | TEMPERATURE: 97.9 F | HEIGHT: 62 IN

## 2022-01-31 DIAGNOSIS — M25.531 RIGHT WRIST PAIN: ICD-10-CM

## 2022-01-31 DIAGNOSIS — S66.911A STRAIN OF RIGHT WRIST, INITIAL ENCOUNTER: ICD-10-CM

## 2022-01-31 PROCEDURE — 99214 OFFICE O/P EST MOD 30 MIN: CPT | Performed by: PHYSICIAN ASSISTANT

## 2022-01-31 PROCEDURE — 73110 X-RAY EXAM OF WRIST: CPT | Mod: RT
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
## 2022-01-31 NOTE — PROGRESS NOTES
"Subjective     Michelle Ziegler is a 26 y.o. 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  She states she has had pain since it initially happened and has been wearing a brace for the wrist with no improvement of the pain after taking over-the-counter Ibuprofen.  She denies any previous injury to this wrist.  She has also tried rest and ice to the area.     HPI  Patient presents today for work-related injury as described above.  Review of Systems   Musculoskeletal:        Right wrist        Objective     /82   Pulse 82   Temp 36.6 °C (97.9 °F) (Temporal)   Resp 14   Ht 1.575 m (5' 2\")   Wt (!) 127 kg (281 lb)   SpO2 97%   BMI 51.40 kg/m²      Physical Exam  Vitals and nursing note reviewed.   Constitutional:       General: She is not in acute distress.     Appearance: Normal appearance. She is well-developed. She is not ill-appearing or toxic-appearing.   HENT:      Head: Normocephalic and atraumatic.      Right Ear: Hearing normal.      Left Ear: Hearing normal.   Cardiovascular:      Rate and Rhythm: Normal rate.   Pulmonary:      Effort: Pulmonary effort is normal.   Musculoskeletal:      Comments: Right wrist as described below   Skin:     General: Skin is warm and dry.   Neurological:      Mental Status: She is alert.      Coordination: Coordination normal.   Psychiatric:         Mood and Affect: Mood normal.       Patient is neurovascular intact distally to the right upper extremity.  3/5  strength secondary to pain.  Significant tenderness to palpation to the right distal radius.  Patient has decreased range of motion of the wrist secondary to pain.  No gross deformity appreciated.  Swelling to the right wrist.  No open wound or abrasion appreciated.  No bony tenderness to the dorsum of the " hand.       DX WRIST  FINDINGS:     No acute fracture or dislocation. The carpal rows appear intact.     No joint osteoarthritis.     IMPRESSION:        No acute osseous abnormality.  Assessment & Plan   1. Right wrist pain  - DX-WRIST-COMPLETE 3+ RIGHT; Future    2. Strain of right wrist, initial encounter  Restrictions are for the right wrist.  Patient should continue with rest, ice, compression and elevation.  Recommend patient follow-up in 1 week for reevaluation at that time.  Continue wearing the wrist brace that was given to her today.  Patient was given a thumb spica splint today that would help provide better support.  Over-the-counter pain medication per 's instructions.  Please note that this dictation was created using voice recognition software. I have made every reasonable attempt to correct obvious errors, but I expect that there may be errors of grammar and possibly content that I did not discover before finalizing the note.   Greater than 30 minutes were spent reviewing patient's chart, examining and obtaining history from patient, and discussing plan of care.

## 2022-01-31 NOTE — LETTER
[facility] [address] [place] [address]  Phone:  [phone] - Fax:  [phone]   [facility] Progress Report and Disability Certification  Date of Service: 1/31/2022   No Show:  No  Date / Time of Next Visit: 2/7/2022 10:00 AM   Claim Information   Patient Name: Michelle Ziegler  Claim Number:     Employer:   [employer] Date of Injury: 1/25/2022     Insurer / TPA: [employer]  ID / SSN:     Occupation: PSR Worker  Diagnosis: Diagnoses of Right wrist pain and Strain of right wrist, initial encounter were pertinent to this visit.    Medical Information   Related to Industrial Injury? 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  She states she has had pain since it initially happened and has been wearing a brace for the wrist with no improvement of the pain after taking over-the-counter Ibuprofen.  She denies any previous injury to this wrist.  She has also tried rest and ice to the area.   Objective Findings: Patient is neurovascular intact distally to the right upper extremity.  3/5  strength secondary to pain.  Significant tenderness to palpation to the right distal radius.  Patient has decreased range of motion of the wrist secondary to pain.  No gross deformity appreciated.  Swelling to the right wrist.  No open wound or abrasion appreciated.  No bony tenderness to the dorsum of the hand.   Pre-Existing Condition(s):     Assessment:   Initial Visit    Status: Additional Care Required  Permanent Disability:No    Plan:      Diagnostics:      Comments:       Disability Information   Status: Released to Restricted Duty    From:  1/31/2022  Through: 2/7/2022 Restrictions are: Temporary   Physical Restrictions   Sitting:    Standing:    Stooping:    Bending:       Squatting:    Walking:    Climbin hrs/day Pushin hrs/day   Pullin hrs/day Other:    Reaching Above Shoulder (L):   Reaching Above Shoulder (R):       Reaching Below Shoulder (L):    Reaching Below Shoulder (R):      Not to exceed Weight Limits   Carrying(hrs):   Weight Limit(lb):   Comments:0 Lifting(hrs):   Weight  Limit(lb):   Comments:0   Comments: Restrictions are for the right wrist.  Patient should continue with rest, ice, compression and elevation.  Recommend patient follow-up in 1 week for reevaluation at that time.  Continue wearing the wrist brace that was given to her today.  Patient was given a thumb spica splint today that would help provide better support.  Over-the-counter pain medication per 's instructions.    Repetitive Actions   Hands: i.e. Fine Manipulations from Graspin hrs/day   Feet: i.e. Operating Foot Controls:     Driving / Operate Machinery:     Health Care Provider’s Original or Electronic Signature  Jake Reaves P.A.-C. Health Care Provider’s Original or Electronic Signature    New Perez MD         Clinic Name / Location: [address] [address] Clinic Phone Number: Dept: [phone]   Appointment Time: 9:40 Am Visit Start Time: 9:57 AM   Check-In Time:  9:55 Am Visit Discharge Time:  11:16 AM   Original-Treating Physician or Chiropractor    Page 2-Insurer/TPA    Page 3-Employer    Page 4-Employee

## 2022-02-14 ENCOUNTER — OCCUPATIONAL MEDICINE (OUTPATIENT)
Dept: URGENT CARE | Facility: PHYSICIAN GROUP | Age: 27
End: 2022-02-14
Payer: COMMERCIAL

## 2022-02-14 VITALS
TEMPERATURE: 97.1 F | WEIGHT: 281 LBS | DIASTOLIC BLOOD PRESSURE: 74 MMHG | BODY MASS INDEX: 51.71 KG/M2 | OXYGEN SATURATION: 97 % | HEIGHT: 62 IN | HEART RATE: 90 BPM | SYSTOLIC BLOOD PRESSURE: 124 MMHG | RESPIRATION RATE: 16 BRPM

## 2022-02-14 DIAGNOSIS — M25.531 RIGHT WRIST PAIN: ICD-10-CM

## 2022-02-14 DIAGNOSIS — S66.911A STRAIN OF RIGHT WRIST, INITIAL ENCOUNTER: ICD-10-CM
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
## 2022-02-14 PROCEDURE — 99213 OFFICE O/P EST LOW 20 MIN: CPT | Performed by: STUDENT IN AN ORGANIZED HEALTH CARE EDUCATION/TRAINING PROGRAM

## 2022-02-14 NOTE — PROGRESS NOTES
"Subjective:     Michelle Ziegler is a 26 y.o. 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  She states she has had pain since it initially happened and has been wearing a brace for the wrist with no improvement of the pain after taking over-the-counter Ibuprofen.  She denies any previous injury to this wrist.  She has also tried rest and ice to the area.    Today's date: 2/14/2022 = second visit  Patient reports he is approximately 65 to 70% better.  She has continued pain along the dorsal and volar aspect of the distal radius.  Symptoms are aggravated with general range of motion but provided the example of opening the foot top of a pop can.  She has been wearing a wrist splint which seems to help.  She has also been taking NSAIDs as needed which helped.    PMH:   No pertinent past medical history to this problem  MEDS:  Medications were reviewed in EMR  ALLERGIES:  Allergies were reviewed in EMR  FH:   No pertinent family history to this problem       Objective:     /74 (BP Location: Left arm, Patient Position: Sitting, BP Cuff Size: Large adult)   Pulse 90   Temp 36.2 °C (97.1 °F) (Temporal)   Resp 16   Ht 1.575 m (5' 2\")   Wt (!) 127 kg (281 lb)   SpO2 97%   BMI 51.40 kg/m²     Gen: no acute distress, normal voice  Skin: dry, intact, moist mucosal membranes  Head: Atraumatic, normocephalic  Psych: normal affect, normal judgement, alert, awake  Musculoskeletal: Right wrist: No erythema, ecchymosis or edema.  Decreased ROM with flexion and extension.  TTP along the distal radiocarpal joint both volarly and dorsally.  No pain with TFCC compression/tension.  Distal sensation and motor fully intact.      Assessment/Plan:       1. Right wrist pain    2. Strain of right wrist, subsequent " encounter    3. Work related injury    • Released to Full Duty FROM 2/14/2022 TO 2/21/2022  • -Restrictions per d39  • -Instructed to discontinue wrist splint  • -Encouraged range of motion as tolerated  • -Continue NSAIDs as needed  • -Follow-up in 1 week for reevaluation  •      Differential diagnosis, natural history, supportive care, and indications for immediate follow-up discussed.

## 2022-02-14 NOTE — LETTER
[facility] [address] [place] [address]  Phone:  [phone] - Fax:  [phone]   [facility] Progress Report and Disability Certification  Date of Service: 2/14/2022   No Show:  No  Date / Time of Next Visit: 2/21/2022 10:00 AM   Claim Information   Patient Name: Michelle Ziegler  Claim Number:     Employer:   [employer]  Date of Injury: 1/25/2022     Insurer / TPA: [employer]  ID / SSN:     Occupation: PSR Worker  Diagnosis: Diagnoses of Right wrist pain, Strain of right wrist, subsequent encounter, and Work related injury were pertinent to this visit.    Medical Information   Related to Industrial Injury? 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  She states she has had pain since it initially happened and has been wearing a brace for the wrist with no improvement of the pain after taking over-the-counter Ibuprofen.  She denies any previous injury to this wrist.  She has also tried rest and ice to the area.    Today's date: 2/14/2022 = second visit  Patient reports he is approximately 65 to 70% better.  She has continued pain along the dorsal and volar aspect of the distal radius.  Symptoms are aggravated with general range of motion but provided the example of opening the foot top of a pop can.  She has been wearing a wrist splint which seems to help.  She has also been taking NSAIDs as needed which helped.   Objective Findings: Gen: no acute distress, normal voice  Skin: dry, intact, moist mucosal membranes  Head: Atraumatic, normocephalic  Psych: normal affect, normal judgement, alert, awake  Musculoskeletal: Right wrist: No erythema, ecchymosis or edema.  Decreased ROM with flexion and extension.  TTP along the distal radiocarpal joint both volarly  and dorsally.  No pain with TFCC compression/tension.  Distal sensation and motor fully intact.     Pre-Existing Condition(s):     Assessment:   Condition Improved    Status: Additional Care Required  Permanent Disability:No    Plan:      Diagnostics:      Comments:       Disability Information   Status: Released to Full Duty    From:  2/14/2022  Through: 2/21/2022 Restrictions are:     Physical Restrictions   Sitting:    Standing:    Stooping:    Bending:      Squatting:    Walking:    Climbing:    Pushing:      Pulling:    Other:    Reaching Above Shoulder (L):   Reaching Above Shoulder (R):       Reaching Below Shoulder (L):    Reaching Below Shoulder (R):      Not to exceed Weight Limits   Carrying(hrs):   Weight Limit(lb): < or = to 10 pounds Lifting(hrs):   Weight  Limit(lb): < or = to 10 pounds   Comments: -Restrictions per d39  -Instructed to discontinue wrist splint  -Encouraged range of motion as tolerated  -Continue NSAIDs as needed  -Follow-up in 1 week for reevaluation    Repetitive Actions   Hands: i.e. Fine Manipulations from Grasping:     Feet: i.e. Operating Foot Controls:     Driving / Operate Machinery:     Health Care Provider’s Original or Electronic Signature  Dutch Sebastian D.O. Health Care Provider’s Original or Electronic Signature    New Perez MD         Clinic Name / Location: [address] [address] Clinic Phone Number: Dept: [phone]   Appointment Time: 10:00 Am Visit Start Time: 10:15 AM   Check-In Time:  9:46 Am Visit Discharge Time:  11:04 AM    Original-Treating Physician or Chiropractor    Page 2-Insurer/TPA    Page 3-Employer    Page 4-Employee

## 2022-02-21 ENCOUNTER — OCCUPATIONAL MEDICINE (OUTPATIENT)
Dept: URGENT CARE | Facility: PHYSICIAN GROUP | Age: 27
End: 2022-02-21
Payer: COMMERCIAL

## 2022-02-21 VITALS
BODY MASS INDEX: 51.16 KG/M2 | HEIGHT: 62 IN | TEMPERATURE: 96.9 F | SYSTOLIC BLOOD PRESSURE: 110 MMHG | RESPIRATION RATE: 16 BRPM | WEIGHT: 278 LBS | HEART RATE: 94 BPM | DIASTOLIC BLOOD PRESSURE: 70 MMHG | OXYGEN SATURATION: 99 %
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
## 2022-02-21 DIAGNOSIS — S66.911A STRAIN OF RIGHT WRIST, INITIAL ENCOUNTER: ICD-10-CM

## 2022-02-21 PROCEDURE — 99213 OFFICE O/P EST LOW 20 MIN: CPT | Performed by: PHYSICIAN ASSISTANT

## 2022-02-21 ASSESSMENT — ENCOUNTER SYMPTOMS: MYALGIAS: 1

## 2022-02-21 NOTE — LETTER
[facility] [address] [address]  Phone:  [phone] - Fax:  [phone]   [facility] Progress Report and Disability Certification  Date of Service: 2/21/2022   No Show:  No  Date / Time of Next Visit:  03/07/2022 11:00 AM   Claim Information   Patient Name: Michelle Ziegler  Claim Number:     Employer:   [employer] Date of Injury: 1/25/2022     Insurer / TPA: [employer]  ID / SSN:     Occupation: PSR Worker  Diagnosis: Diagnoses of Strain of right wrist, subsequent encounter and Work related injury were pertinent to this visit.    Medical Information   Related to Industrial Injury? Yes    Subjective Complaints:  Date of injury: 1/25/2022.  Patient presents for evaluation of right wrist pain.  Overall symptoms continue to improve, but she does continue to have pain with certain movements and when the wrist is bumped.  Pain is predominantly on the radial side of the wrist when it occurs.  Wrist flexion and lifting often exacerbated.  Pain is typically sharp initially but becomes aching.  Pain resolves after a few minutes.  She endorses restricted range of motion, but states that this is also improving.  No numbness and tingling in the wrist or hand.  She is no longer using a wrist brace.  She is not taking any prescription or OTC analgesics for her symptoms.  She is tolerating full duties.       Objective Findings: Right wrist/hand:  Appearance: No erythema or edema.  Skin is intact and atraumatic.  Range of motion: Mildly restricted flexion and extension as compared to the left wrist.  Ulnar and radial deviation within normal limits.  Strength:  strength 5 out of 5 bilaterally.  Neurovascular: Radial, median, ulnar nerves intact bilaterally.  Sensation intact and even bilaterally.  Radial pulses 2+ bilaterally and cap refill is brisk.   Pre-Existing Condition(s):     Assessment:   Condition Improved     Status: Additional Care Required  Permanent Disability:No    Plan:      Diagnostics:      Comments:  Patient continues to make good progress, but she continues to experience symptoms and mildly restricted range of motion.  I would like to see her back in 2 weeks for recheck.  If symptoms continue to improve anticipate MMI/discharge at next appointme  nt.    Disability Information   Status: Released to Full Duty    From:     Through:   Restrictions are:     Physical Restrictions   Sitting:    Standing:    Stooping:    Bending:      Squatting:    Walking:    Climbing:    Pushing:      Pulling:    Other:    Reaching Above Shoulder (L):   Reaching Above Shoulder (R):       Reaching Below Shoulder (L):    Reaching Below Shoulder (R):      Not to exceed Weight Limits   Carrying(hrs):   Weight Limit(lb):   Lifting(hrs):   Weight  Limit(lb):     Comments:      Repetitive Actions   Hands: i.e. Fine Manipulations from Grasping:     Feet: i.e. Operating Foot Controls:     Driving / Operate Machinery:     Health Care Provider’s Original or Electronic Signature  Donaldo Allen P.A.-C. Health Care Provider’s Original or Electronic Signature    New Perez MD         Clinic Name / Location: [address] [address] Clinic Phone Number: Dept: [phone]   Appointment Time: 9:55 Am Visit Start Time: 10:57 AM   Check-In Time:  9:57 Am Visit Discharge Time:  11:30AM   Original-Treating Physician or Chiropractor    Page 2-Insurer/TPA    Page 3-Employer    Page 4-Employee

## 2022-02-21 NOTE — PROGRESS NOTES
"Subjective:   Michelle Ziegler is a 26 y.o. female who presents for Wrist Injury ( 3rd follow up visit)        Date of injury: 1/25/2022.  Patient presents for evaluation of right wrist pain.  Overall symptoms continue to improve, but she does continue to have pain with certain movements and when the wrist is bumped.  Pain is predominantly on the radial side of the wrist when it occurs.  Wrist flexion and lifting often exacerbated.  Pain is typically sharp initially but becomes aching.  Pain resolves after a few minutes.  She endorses restricted range of motion, but states that this is also improving.  No numbness and tingling in the wrist or hand.  She is no longer using a wrist brace.  She is not taking any prescription or OTC analgesics for her symptoms.  She is tolerating full duties.      Review of Systems   Musculoskeletal: Positive for joint pain and myalgias.       PMH: Past medical history reviewed in Epic  MEDS: Medications were reviewed in Epic  ALLERGIES: Allergies were reviewed in Epic     Objective:   /70   Pulse 94   Temp 36.1 °C (96.9 °F)   Resp 16   Ht 1.575 m (5' 2\")   Wt (!) 126 kg (278 lb)   SpO2 99%   BMI 50.85 kg/m²   Physical Exam  Musculoskeletal:      Comments: Right wrist/hand:  Appearance: No erythema or edema.  Skin is intact and atraumatic.  Range of motion: Mildly restricted flexion and extension as compared to the left wrist.  Ulnar and radial deviation within normal limits.  Strength:  strength 5 out of 5 bilaterally.  Neurovascular: Radial, median, ulnar nerves intact bilaterally.  Sensation intact and even bilaterally.  Radial pulses 2+ bilaterally and cap refill is brisk.           Assessment/Plan:   1. Strain of right wrist, subsequent encounter    2. Work related injury    Patient continues to make good progress, but she continues to experience symptoms and mildly restricted range of motion.  I would like to see her back in 2 weeks for recheck.  If symptoms " continue to improve anticipate MMI/discharge at next appointment.    Differential diagnosis, natural history, supportive care, and indications for immediate follow-up discussed.

## 2022-03-07 ENCOUNTER — OCCUPATIONAL MEDICINE (OUTPATIENT)
Dept: URGENT CARE | Facility: PHYSICIAN GROUP | Age: 27
End: 2022-03-07
Payer: COMMERCIAL

## 2022-03-07 VITALS
DIASTOLIC BLOOD PRESSURE: 74 MMHG | TEMPERATURE: 97.1 F | OXYGEN SATURATION: 96 % | BODY MASS INDEX: 50.42 KG/M2 | SYSTOLIC BLOOD PRESSURE: 112 MMHG | WEIGHT: 274 LBS | RESPIRATION RATE: 14 BRPM | HEART RATE: 93 BPM | HEIGHT: 62 IN
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
## 2022-03-07 DIAGNOSIS — S66.911A STRAIN OF RIGHT WRIST, INITIAL ENCOUNTER: ICD-10-CM

## 2022-03-07 DIAGNOSIS — M25.531 RIGHT WRIST PAIN: ICD-10-CM

## 2022-03-07 PROCEDURE — 99213 OFFICE O/P EST LOW 20 MIN: CPT | Performed by: FAMILY MEDICINE

## 2022-03-07 NOTE — LETTER
[facility] [address] [address]  Phone:  [phone] - Fax:  [phone]   [facility] Progress Report and Disability Certification  Date of Service: 3/7/2022   No Show:  No  Date / Time of Next Visit:     Claim Information   Patient Name: Michelle Ziegler  Claim Number:     Employer:    Date of Injury: 1/25/2022     Insurer / TPA: [employer]  ID / SSN:     Occupation: PSR Worker  Diagnosis: Diagnoses of Work related injury, Right wrist pain, and Strain of right wrist, subsequent encounter were pertinent to this visit.    Medical Information   Related to Industrial Injury? Yes    Subjective Complaints:  Date of injury: 1/25/2022.  MELISA: Pushing a child on a carousel, when she stopped it, she ended up jamming her right wrist. She is right hand dominant. No prior right wrist injury. Denies secondary employment.     Visit #4 today: She is feeling better today, back to 99% of baseline. She has been back to full duty at work without issues. She is not needing to use any Tylenol or Ibuprofen or wrist brace at this time.    Objective Findings: No discolorations or deformities noted to inspection of right wrist/hand.  Active range of motion of wrist against resistance remains intact.  She is able to keep fingers abducted and the okay sign intact against resistance.  Equal strength and sensation to upper extremities bilaterally.   Pre-Existing Condition(s):     Assessment:   Condition Improved    Status: Discharged /  MMI  Permanent Disability:No    Plan:      Diagnostics:      Comments:  MMI achieved    Disability Information   Status: Released to Full Duty    From:     Through:   Restrictions are:     Physical Restrictions   Sitting:    Standing:    Stooping:    Bending:      Squatting:    Walking:    Climbing:    Pushing:      Pulling:    Other:    Reaching Above Shoulder (L):   Reaching Above Shoulder (R):       Reaching Below  Shoulder (L):    Reaching Below Shoulder (R):      Not to exceed Weight Limits   Carrying(hrs):   Weight Limit(lb):   Lifting(hrs):   Weight  Limit(lb):     Comments:      Repetitive Actions   Hands: i.e. Fine Manipulations from Grasping:     Feet: i.e. Operating Foot Controls:     Driving / Operate Machinery:     Health Care Provider’s Original or Electronic Signature  Lauryn Trent M.D. Health Care Provider’s Original or Electronic Signature    New Perez MD         Clinic Name / Location: [address] [address] Clinic Phone Number: Dept: [phone]   Appointment Time: 11:00 Am Visit Start Time: 11:26 AM   Check-In Time:  10:47 Am Visit Discharge Time:  11:74 AM   Original-Treating Physician or Chiropractor    Page 2-Insurer/TPA    Page 3-Employer    Page 4-Employee

## 2022-03-07 NOTE — PROGRESS NOTES
"  Subjective:     27 y.o. female presents for Work-Related Injury ( FV DOI: 01-25-22 R wrist. )      Date of injury: 1/25/2022.  MELISA: Pushing a child on a carousel, when she stopped it, she ended up jamming her right wrist. She is right hand dominant. No prior right wrist injury. Denies secondary employment.     Visit #4 today: She is feeling better today, back to 99% of baseline. She has been back to full duty at work without issues. She is not needing to use any Tylenol or Ibuprofen or wrist brace at this time.     PMH:   No pertinent past medical history to this problem  MEDS:  Medications were reviewed in EMR  ALLERGIES:  Allergies were reviewed in EMR  FH:   No pertinent family history to this problem     Objective:     /74   Pulse 93   Temp 36.2 °C (97.1 °F) (Temporal)   Resp 14   Ht 1.575 m (5' 2\")   Wt 124 kg (274 lb)   LMP  (LMP Unknown)   SpO2 96%   BMI 50.12 kg/m²     No discolorations or deformities noted to inspection of right wrist/hand.  Active range of motion of wrist against resistance remains intact.  She is able to keep fingers abducted and the okay sign intact against resistance.  Equal strength and sensation to upper extremities bilaterally.    Assessment/Plan:     1. Work related injury    2. Right wrist pain    3. Strain of right wrist, subsequent encounter    • Released to Full Duty FROM   TO    •    • MMI achieved    Differential diagnosis, natural history, supportive care, and indications for immediate follow-up discussed.  "

## 2023-11-17 ENCOUNTER — OFFICE VISIT (OUTPATIENT)
Dept: URGENT CARE | Facility: CLINIC | Age: 28
End: 2023-11-17
Payer: COMMERCIAL

## 2023-11-17 VITALS
SYSTOLIC BLOOD PRESSURE: 122 MMHG | WEIGHT: 260 LBS | HEART RATE: 68 BPM | BODY MASS INDEX: 47.84 KG/M2 | HEIGHT: 62 IN | OXYGEN SATURATION: 98 % | TEMPERATURE: 97.1 F | RESPIRATION RATE: 14 BRPM | DIASTOLIC BLOOD PRESSURE: 68 MMHG

## 2023-11-17 DIAGNOSIS — H93.8X3 EAR FULLNESS, BILATERAL: ICD-10-CM

## 2023-11-17 PROCEDURE — 99213 OFFICE O/P EST LOW 20 MIN: CPT | Performed by: STUDENT IN AN ORGANIZED HEALTH CARE EDUCATION/TRAINING PROGRAM

## 2023-11-17 PROCEDURE — 3074F SYST BP LT 130 MM HG: CPT | Performed by: STUDENT IN AN ORGANIZED HEALTH CARE EDUCATION/TRAINING PROGRAM

## 2023-11-17 PROCEDURE — 3078F DIAST BP <80 MM HG: CPT | Performed by: STUDENT IN AN ORGANIZED HEALTH CARE EDUCATION/TRAINING PROGRAM

## 2023-11-17 RX ORDER — FLUTICASONE PROPIONATE 50 MCG
1 SPRAY, SUSPENSION (ML) NASAL DAILY
Qty: 16 G | Refills: 0 | Status: SHIPPED | OUTPATIENT
Start: 2023-11-17

## 2023-11-17 RX ORDER — CETIRIZINE HYDROCHLORIDE 10 MG/1
10 TABLET ORAL DAILY
Qty: 30 TABLET | Refills: 0 | Status: SHIPPED | OUTPATIENT
Start: 2023-11-17

## 2023-11-17 ASSESSMENT — ENCOUNTER SYMPTOMS
DIZZINESS: 0
WHEEZING: 0
PALPITATIONS: 0
SHORTNESS OF BREATH: 0
CHILLS: 0
HEADACHES: 0
SORE THROAT: 0
FEVER: 0
COUGH: 0

## 2023-11-17 NOTE — PROGRESS NOTES
"Subjective     Michelle Ziegler is a 28 y.o. female who presents with Ear Fullness (Sx both ears x Last week , prior Hx of infection (May of this year) )            Michelle is a 28 y.o. female who presents to urgent care with bilateral ear fullness.  Patient states symptoms started 1 week ago and that she is hearing \"popping\" sensation in ear. Patient initially started after cold-like illness.  Cold-like symptoms have resolved but ears are still feeling full.  No ear pain.  No fever/chills.  Patient reports having an ear infection back in May and wanted to make sure she does not have an ear infection again.    Ear Fullness  This is a new problem. The current episode started in the past 7 days. The problem has been unchanged. Pertinent negatives include no chest pain, chills, congestion, coughing, fever, headaches or sore throat.       Review of Systems   Constitutional:  Negative for chills, fever and malaise/fatigue.   HENT:  Negative for congestion, ear discharge, ear pain, sore throat and tinnitus.    Respiratory:  Negative for cough, shortness of breath and wheezing.    Cardiovascular:  Negative for chest pain and palpitations.   Neurological:  Negative for dizziness and headaches.   All other systems reviewed and are negative.             Objective     /68 (BP Location: Left arm, Patient Position: Sitting, BP Cuff Size: Large adult)   Pulse 68   Temp 36.2 °C (97.1 °F) (Temporal)   Resp 14   Ht 1.575 m (5' 2\")   Wt 118 kg (260 lb)   SpO2 98%   BMI 47.55 kg/m²      Physical Exam  Vitals reviewed.   Constitutional:       General: She is not in acute distress.     Appearance: Normal appearance. She is not toxic-appearing.   HENT:      Head: Normocephalic and atraumatic.      Right Ear: Ear canal and external ear normal. A middle ear effusion is present.      Left Ear: Ear canal and external ear normal. A middle ear effusion is present.      Nose: Nose normal.      Mouth/Throat:      Mouth: Mucous " membranes are moist.      Pharynx: Oropharynx is clear.   Eyes:      Extraocular Movements: Extraocular movements intact.      Conjunctiva/sclera: Conjunctivae normal.      Pupils: Pupils are equal, round, and reactive to light.   Cardiovascular:      Rate and Rhythm: Normal rate and regular rhythm.   Pulmonary:      Effort: Pulmonary effort is normal.      Breath sounds: Normal breath sounds.   Skin:     General: Skin is warm and dry.   Neurological:      General: No focal deficit present.      Mental Status: She is alert. Mental status is at baseline.                             Assessment & Plan        1. Ear fullness, bilateral  - Middle ear effusion with no signs of infection on physical exam. TMs intact bilaterally. Patient reports no ear pain.  - fluticasone (FLONASE) 50 MCG/ACT nasal spray; Administer 1 Spray into affected nostril(S) every day.  Dispense: 16 g; Refill: 0  - cetirizine (ZYRTEC ALLERGY) 10 MG Tab; Take 1 Tablet by mouth every day.  Dispense: 30 Tablet; Refill: 0     Differential diagnoses, supportive care measures and indications for immediate follow-up discussed with patient. Pathogenesis of diagnosis discussed including typical length and natural progression. Follow up with PCP.    Instructed to return to urgent care or nearest emergency department if symptoms fail to improve, for any change in condition, further concerns, or new concerning symptoms.    Patient states understanding and agrees with the plan of care and discharge instructions.
